# Patient Record
Sex: FEMALE | Race: WHITE | NOT HISPANIC OR LATINO | Employment: UNEMPLOYED | URBAN - METROPOLITAN AREA
[De-identification: names, ages, dates, MRNs, and addresses within clinical notes are randomized per-mention and may not be internally consistent; named-entity substitution may affect disease eponyms.]

---

## 2023-02-03 ENCOUNTER — HOSPITAL ENCOUNTER (OUTPATIENT)
Dept: DATA CONVERSION | Facility: HOSPITAL | Age: 23
End: 2023-02-03
Attending: OBSTETRICS & GYNECOLOGY

## 2023-02-03 DIAGNOSIS — O21.8 OTHER VOMITING COMPLICATING PREGNANCY (HHS-HCC): ICD-10-CM

## 2023-02-03 DIAGNOSIS — O23.42 UNSPECIFIED INFECTION OF URINARY TRACT IN PREGNANCY, SECOND TRIMESTER (HHS-HCC): ICD-10-CM

## 2023-02-03 DIAGNOSIS — F32.A DEPRESSION, UNSPECIFIED: ICD-10-CM

## 2023-02-03 DIAGNOSIS — F14.21 COCAINE DEPENDENCE, IN REMISSION (MULTI): ICD-10-CM

## 2023-02-03 DIAGNOSIS — F43.10 POST-TRAUMATIC STRESS DISORDER, UNSPECIFIED: ICD-10-CM

## 2023-02-03 DIAGNOSIS — O21.9 VOMITING OF PREGNANCY, UNSPECIFIED (HHS-HCC): ICD-10-CM

## 2023-02-03 DIAGNOSIS — Z3A.16 16 WEEKS GESTATION OF PREGNANCY (HHS-HCC): ICD-10-CM

## 2023-02-03 DIAGNOSIS — N39.0 URINARY TRACT INFECTION, SITE NOT SPECIFIED: ICD-10-CM

## 2023-02-03 DIAGNOSIS — O26.892 OTHER SPECIFIED PREGNANCY RELATED CONDITIONS, SECOND TRIMESTER (HHS-HCC): ICD-10-CM

## 2023-02-03 DIAGNOSIS — R10.30 LOWER ABDOMINAL PAIN, UNSPECIFIED: ICD-10-CM

## 2023-02-03 DIAGNOSIS — O99.342 OTHER MENTAL DISORDERS COMPLICATING PREGNANCY, SECOND TRIMESTER (HHS-HCC): ICD-10-CM

## 2023-02-03 DIAGNOSIS — Z79.899 OTHER LONG TERM (CURRENT) DRUG THERAPY: ICD-10-CM

## 2023-02-03 LAB
APPEARANCE, URINE: ABNORMAL
BASOPHILS (10*3/UL) IN BLOOD BY AUTOMATED COUNT: 0.01 X10E9/L (ref 0–0.1)
BASOPHILS/100 LEUKOCYTES IN BLOOD BY AUTOMATED COUNT: 0.2 % (ref 0–2)
BILIRUBIN, URINE: NEGATIVE
BLOOD, URINE: NEGATIVE
COLOR, URINE: ABNORMAL
EOSINOPHILS (10*3/UL) IN BLOOD BY AUTOMATED COUNT: 0.05 X10E9/L (ref 0–0.7)
EOSINOPHILS/100 LEUKOCYTES IN BLOOD BY AUTOMATED COUNT: 0.8 % (ref 0–6)
ERYTHROCYTE DISTRIBUTION WIDTH (RATIO) BY AUTOMATED COUNT: 14.1 % (ref 11.5–14.5)
ERYTHROCYTE MEAN CORPUSCULAR HEMOGLOBIN CONCENTRATION (G/DL) BY AUTOMATED: 32.6 G/DL (ref 32–36)
ERYTHROCYTE MEAN CORPUSCULAR VOLUME (FL) BY AUTOMATED COUNT: 85 FL (ref 80–100)
ERYTHROCYTES (10*6/UL) IN BLOOD BY AUTOMATED COUNT: 4.09 X10E12/L (ref 4–5.2)
GLUCOSE, URINE: ABNORMAL MG/DL
HEMATOCRIT (%) IN BLOOD BY AUTOMATED COUNT: 34.7 % (ref 36–46)
HEMOGLOBIN (G/DL) IN BLOOD: 11.3 G/DL (ref 12–16)
HYALINE CASTS, URINE: ABNORMAL /LPF
IMMATURE GRANULOCYTES/100 LEUKOCYTES IN BLOOD BY AUTOMATED COUNT: 0.2 % (ref 0–0.9)
KETONES, URINE: NEGATIVE MG/DL
LEUKOCYTE ESTERASE, URINE: NEGATIVE
LEUKOCYTES (10*3/UL) IN BLOOD BY AUTOMATED COUNT: 6.2 X10E9/L (ref 4.4–11.3)
LYMPHOCYTES (10*3/UL) IN BLOOD BY AUTOMATED COUNT: 1.02 X10E9/L (ref 1.2–4.8)
LYMPHOCYTES/100 LEUKOCYTES IN BLOOD BY AUTOMATED COUNT: 16.5 % (ref 13–44)
MONOCYTES (10*3/UL) IN BLOOD BY AUTOMATED COUNT: 0.46 X10E9/L (ref 0.1–1)
MONOCYTES/100 LEUKOCYTES IN BLOOD BY AUTOMATED COUNT: 7.4 % (ref 2–10)
MUCUS, URINE: ABNORMAL /LPF
NEUTROPHILS (10*3/UL) IN BLOOD BY AUTOMATED COUNT: 4.65 X10E9/L (ref 1.2–7.7)
NEUTROPHILS/100 LEUKOCYTES IN BLOOD BY AUTOMATED COUNT: 74.9 % (ref 40–80)
NITRITE, URINE: POSITIVE
NRBC (PER 100 WBCS) BY AUTOMATED COUNT: 0 /100 WBC (ref 0–0)
PH, URINE: 5 (ref 5–8)
PLATELETS (10*3/UL) IN BLOOD AUTOMATED COUNT: 233 X10E9/L (ref 150–450)
PROTEIN, URINE: ABNORMAL MG/DL
RBC, URINE: 1 /HPF (ref 0–5)
SPECIFIC GRAVITY, URINE: 1.01 (ref 1–1.03)
SQUAMOUS EPITHELIAL CELLS, URINE: 11 /HPF
UROBILINOGEN, URINE: 4 MG/DL (ref 0–1.9)
WBC, URINE: 34 /HPF (ref 0–5)

## 2023-02-05 LAB
CHLAMYDIA TRACH., AMPLIFIED: NEGATIVE
N. GONORRHEA, AMPLIFIED: NEGATIVE

## 2023-03-03 NOTE — PROGRESS NOTES
Current Stage:   Stage: Triage     Subjective Data:   Antepartum:  Vaginal Bleeding: No   Contractions/Abdominal Pain: Yes   Discharge/Loss of Fluid: No   Fetal Movement: None   Fevers/Chills: No   Preeclampsia Symptoms: No   Antepartum:    21 yo  at 16.4wga by 11.5 wk US at [facility], presents with lower abdominal cramping and N/V x past 24 hrs.     Pt was seen on  at [facility] for UTI symptoms and receives care at [facility]. Pt's UA yesterday showed + nitrites, was given Macrobid which she started this am after she vomited. Denies F/C. Denies VB, vaginal itching, irritation, or odor. Pt reports lower abdominal  cramping became worse this morning. She has been taking AZO and thought her urine had blood in it today.     Pregnancy notable for:  - LISSY, sober 5 months from crack/cocaine. Living in [facility]. Utox negative on   - CT and trich + on  and treated, trich MARILOU neg, no CT MARILOU, sent on 2/3  - UTI at 7wga; pan-sens E coli, - 3 UTIs this pregnancy; suppression?  - depression/PTSD; hospitalized for SI/HI this preg. Currently taking Zoloft 50 mg daily.     OBHx: reports 4 early SABs  GYN hx:  recent h/o of trich and CT  PMH: denies  PSH: wisdom teeth, gallbladder  Fam hx: non-contributory  Meds: PNV, Zoloft, Macrobid  All: PCN, Sulfa  Social hx: denies t/e/d. Currently lives in [facility]. Feels safe there.           Objective Information:    Objective Information:      T   P  R  BP   MAP  SpO2   Value  36  81  16  115/67   86  98%  Date/Time 2/3 11:35 2/3 12:22 3 14:20 3 11:43  23 11:43 23 14:20  Range  (36C - 36.4C )  (81 - 120 )  (16 - 16 )  (115 - 123 )/ (67 - 84 )  (86 - 86 )  (87% - 100% )      Pain reported at 2/3 14:20: 0 = None      Physical Exam:   Constitutional: alert, oriented x3, conversational   Obstetric: Doptones 141  SVE: closed/long/high   Eyes: Sclera white, EOM intact, no discharge or erythema   ENMT: No hearing deficit, no goiter present   Head/Neck:  Normocephalic, atraumatic, full range  of motion intact   Respiratory/Thorax: normal respiratory effort   Gastrointestinal: soft, gravid, non-tender   Genitourinary: No flank pain bilaterally  Urine dip + nitrites, UA negative blood   Musculoskeletal: Grossly WNL   Extremities: No edema, discoloration, or pain in  BLE   Neurological: Speech clear, no deficits noted   Psychological: Appropriate mood, behavior. Calm,  cooperative.   Skin: no rashes or lesions     Recent Lab Results:    Results:    CBC: 2/3/2023 13:17              \     Hgb     /                              \     11.3 L    /  WBC  ----------------  Plt               6.2       ----------------    233              /     Hct     \                              /     34.7 L    \            RBC: 4.09     MCV: 85     Neutrophil %: 74.9      CMP: 2/3/2023 12:10  NA+        Cl-     BUN  /                         Canceled    Canceled    Canceled  /  --------------------------------  Glucose                ---------------------------  Canceled    K+     HCO3-   Creat \                         Canceled    Canceled    Canceled  \           \  T Bili  /                    \  Canceled  /  AST  x ---- x ALT        Canceled x ---- x Canceled Patients treated with Sulfasalazine may generate    falsely decreased results for ALT.         /  Alk P   \               /  Canceled  \  Calcium : Canceled     Anion Gap : Canceled     Albumin : Canceled     T Protein : Canceled           Assessment and Plan:   Assessment:    23 yo  at 16.4wga by 11.5 wk US at [facility], presents with lower abdominal cramping and N/V x past 24 hrs.     Abdominal Pain/Nausea/Vomiting  - seen at [facility] on  and prescribed Macrobid for UTI (+nitrites on UA)  - high suspicion for UTI, pt started Macrobid this am after vomiting  - pt here for N/V,  blood in urine, last took AZO at 0630  - afebrile, no flank pain  - UA negative for blood, culture sent  - GC/CT sent  - CBC wnl  - LR bolus, IV  Zofran, Tylenol given with resolution of symptoms  - SVE closed/long/high, no suspicion for PTL at this time  - discussed warning signs/when to return for eval, pt verb understanding  - Tylenol and Zofran scripts sent  - encouraged BRAT diet and increased hydration    Maternal Well-being  - Vital signs stable and WNL  - Emotional support and reassurance provided  - All questions and concerns addressed  - encouraged to keep appt as scheduled on 2/24 at [facility] or sooner if needed  - SAFET low risk    Fetal Well Being  - Doptones 141    Dispo: Home with precautions  Plan discussed with Dr. Prescott and agrees with d/c home.  Jackie Powers, MSN, APRN, WHNP-BC                Plan of Care Reviewed With:  Plan of Care Reviewed With: patient     Attestation:   Note Completion:  I am a:  Advanced Practice Provider   Attending Only - Shared Visit with Advanced Practice Provider This is a shared visit.  I have reviewed the Advanced Practice Provider?s encounter note, approve the Advanced Practice Provider?s documentation,  and provide the following additional information from my personal encounter.    Comments/ Additional Findings    16 weeks, known UTI with only one dose of macrobid taken.  Presents with N/V.  No CVA tenderness and normal white count.  Pt tolerated by mouth challenge  after getting IV fluids and Zofran.  Ok for discharge.  Return precautions discussed  Conor Prescott MD          Electronic Signatures:  Conor Prescott)  (Signed 03-Feb-2023 15:50)   Authored: Note Completion   Co-Signer: Current Stage, Subjective Data, Objective Data, Assessment and Plan, Note Completion  Jackie Powers (APRN-CNP)  (Signed 03-Feb-2023 14:50)   Authored: Current Stage, Subjective Data, Objective Data,  Assessment and Plan, Note Completion      Last Updated: 03-Feb-2023 15:50 by Conor Prescott)

## 2023-03-05 ENCOUNTER — HOSPITAL ENCOUNTER (OUTPATIENT)
Dept: DATA CONVERSION | Facility: HOSPITAL | Age: 23
End: 2023-03-05
Attending: OBSTETRICS & GYNECOLOGY

## 2023-03-05 DIAGNOSIS — O99.342 OTHER MENTAL DISORDERS COMPLICATING PREGNANCY, SECOND TRIMESTER (HHS-HCC): ICD-10-CM

## 2023-03-05 DIAGNOSIS — Z3A.20 20 WEEKS GESTATION OF PREGNANCY (HHS-HCC): ICD-10-CM

## 2023-03-05 DIAGNOSIS — Z87.440 PERSONAL HISTORY OF URINARY (TRACT) INFECTIONS: ICD-10-CM

## 2023-03-05 DIAGNOSIS — M54.9 DORSALGIA, UNSPECIFIED: ICD-10-CM

## 2023-03-05 DIAGNOSIS — O26.892 OTHER SPECIFIED PREGNANCY RELATED CONDITIONS, SECOND TRIMESTER (HHS-HCC): ICD-10-CM

## 2023-03-05 DIAGNOSIS — O99.891 OTHER SPECIFIED DISEASES AND CONDITIONS COMPLICATING PREGNANCY (HHS-HCC): ICD-10-CM

## 2023-03-05 DIAGNOSIS — R10.2 PELVIC AND PERINEAL PAIN: ICD-10-CM

## 2023-03-05 DIAGNOSIS — O99.322 DRUG USE COMPLICATING PREGNANCY, SECOND TRIMESTER (HHS-HCC): ICD-10-CM

## 2023-03-05 DIAGNOSIS — F32.A DEPRESSION, UNSPECIFIED: ICD-10-CM

## 2023-03-05 DIAGNOSIS — R11.0 NAUSEA: ICD-10-CM

## 2023-03-05 DIAGNOSIS — Z79.899 OTHER LONG TERM (CURRENT) DRUG THERAPY: ICD-10-CM

## 2023-03-05 DIAGNOSIS — F14.91 COCAINE USE, UNSPECIFIED, IN REMISSION: ICD-10-CM

## 2023-03-05 DIAGNOSIS — F43.10 POST-TRAUMATIC STRESS DISORDER, UNSPECIFIED: ICD-10-CM

## 2023-03-09 LAB — URINE CULTURE: ABNORMAL

## 2023-04-02 ENCOUNTER — HOSPITAL ENCOUNTER (OUTPATIENT)
Dept: DATA CONVERSION | Facility: HOSPITAL | Age: 23
End: 2023-04-02
Attending: OBSTETRICS & GYNECOLOGY | Admitting: OBSTETRICS & GYNECOLOGY

## 2023-04-02 DIAGNOSIS — Z87.440 PERSONAL HISTORY OF URINARY (TRACT) INFECTIONS: ICD-10-CM

## 2023-04-02 DIAGNOSIS — O26.892 OTHER SPECIFIED PREGNANCY RELATED CONDITIONS, SECOND TRIMESTER (HHS-HCC): ICD-10-CM

## 2023-04-02 DIAGNOSIS — R10.2 PELVIC AND PERINEAL PAIN: ICD-10-CM

## 2023-04-02 DIAGNOSIS — O23.12: ICD-10-CM

## 2023-04-02 DIAGNOSIS — Z3A.24 24 WEEKS GESTATION OF PREGNANCY (HHS-HCC): ICD-10-CM

## 2023-04-02 DIAGNOSIS — O09.292 SUPERVISION OF PREGNANCY WITH OTHER POOR REPRODUCTIVE OR OBSTETRIC HISTORY, SECOND TRIMESTER (HHS-HCC): ICD-10-CM

## 2023-04-02 DIAGNOSIS — O99.322 DRUG USE COMPLICATING PREGNANCY, SECOND TRIMESTER (HHS-HCC): ICD-10-CM

## 2023-04-02 DIAGNOSIS — O99.342 OTHER MENTAL DISORDERS COMPLICATING PREGNANCY, SECOND TRIMESTER (HHS-HCC): ICD-10-CM

## 2023-04-02 DIAGNOSIS — F43.10 POST-TRAUMATIC STRESS DISORDER, UNSPECIFIED: ICD-10-CM

## 2023-04-02 DIAGNOSIS — Z79.899 OTHER LONG TERM (CURRENT) DRUG THERAPY: ICD-10-CM

## 2023-04-02 DIAGNOSIS — F14.91 COCAINE USE, UNSPECIFIED, IN REMISSION: ICD-10-CM

## 2023-04-02 DIAGNOSIS — Z59.819 HOUSING INSTABILITY, HOUSED UNSPECIFIED: ICD-10-CM

## 2023-04-02 DIAGNOSIS — N89.8 OTHER SPECIFIED NONINFLAMMATORY DISORDERS OF VAGINA: ICD-10-CM

## 2023-04-02 DIAGNOSIS — F32.A DEPRESSION, UNSPECIFIED: ICD-10-CM

## 2023-04-02 DIAGNOSIS — Z3A.26 26 WEEKS GESTATION OF PREGNANCY (HHS-HCC): ICD-10-CM

## 2023-04-02 SDOH — ECONOMIC STABILITY - HOUSING INSECURITY: HOUSING INSTABILITY UNSPECIFIED: Z59.819

## 2023-04-03 LAB
CHLAMYDIA TRACH., AMPLIFIED: NEGATIVE
CLUE CELLS WET PREP: NORMAL
N. GONORRHEA, AMPLIFIED: NEGATIVE
TRICH WET PREP: NORMAL
TRICHOMONAS REFLEX COMMENT: NORMAL
TRICHOMONAS VAGINALIS: NEGATIVE
WBC WET PREP: NORMAL /HPF
YEAST PRESENCE WET PREP: NORMAL

## 2023-04-04 LAB — URINE CULTURE: NO GROWTH

## 2023-04-13 ENCOUNTER — HOSPITAL ENCOUNTER (OUTPATIENT)
Age: 23
Setting detail: OBSERVATION
Discharge: OTHER INSTITUTION | End: 2023-04-14
Attending: OBSTETRICS & GYNECOLOGY | Admitting: OBSTETRICS & GYNECOLOGY
Payer: COMMERCIAL

## 2023-04-13 PROBLEM — Z3A.26 26 WEEKS GESTATION OF PREGNANCY: Status: ACTIVE | Noted: 2023-04-13

## 2023-04-13 LAB
AMPHET UR QL SCN: NOT DETECTED
BARBITURATES UR QL SCN: NOT DETECTED
BENZODIAZ UR QL SCN: NOT DETECTED
BILIRUB UR QL STRIP: NEGATIVE
CANNABINOIDS UR QL SCN: NOT DETECTED
CLARITY UR: CLEAR
COCAINE UR QL SCN: NOT DETECTED
COLOR UR: YELLOW
DRUG SCREEN COMMENT UR-IMP: NORMAL
FENTANYL SCREEN, URINE: NOT DETECTED
GLUCOSE UR STRIP-MCNC: NEGATIVE MG/DL
HGB UR QL STRIP: NEGATIVE
KETONES UR STRIP-MCNC: NEGATIVE MG/DL
LEUKOCYTE ESTERASE UR QL STRIP: NEGATIVE
METHADONE UR QL SCN: NOT DETECTED
NITRITE UR QL STRIP: NEGATIVE
OPIATES UR QL SCN: NOT DETECTED
OXYCODONE URINE: NOT DETECTED
PCP UR QL SCN: NOT DETECTED
PH UR STRIP: 6.5 [PH] (ref 5–9)
PROT UR STRIP-MCNC: NEGATIVE MG/DL
SP GR UR STRIP: 1.02 (ref 1–1.03)
SPECIMEN SOURCE: NORMAL
UROBILINOGEN UR STRIP-ACNC: 0.2 E.U./DL

## 2023-04-13 PROCEDURE — 80307 DRUG TEST PRSMV CHEM ANLYZR: CPT

## 2023-04-13 PROCEDURE — 81003 URINALYSIS AUTO W/O SCOPE: CPT

## 2023-04-13 PROCEDURE — 99221 1ST HOSP IP/OBS SF/LOW 40: CPT | Performed by: MIDWIFE

## 2023-04-13 PROCEDURE — G0378 HOSPITAL OBSERVATION PER HR: HCPCS

## 2023-04-13 PROCEDURE — 87491 CHLMYD TRACH DNA AMP PROBE: CPT

## 2023-04-13 PROCEDURE — 87591 N.GONORRHOEAE DNA AMP PROB: CPT

## 2023-04-14 ENCOUNTER — ANCILLARY PROCEDURE (OUTPATIENT)
Dept: OBGYN CLINIC | Age: 23
End: 2023-04-14
Payer: COMMERCIAL

## 2023-04-14 VITALS
HEART RATE: 68 BPM | RESPIRATION RATE: 16 BRPM | TEMPERATURE: 97.8 F | SYSTOLIC BLOOD PRESSURE: 110 MMHG | DIASTOLIC BLOOD PRESSURE: 53 MMHG | OXYGEN SATURATION: 100 %

## 2023-04-14 PROCEDURE — 6370000000 HC RX 637 (ALT 250 FOR IP): Performed by: MIDWIFE

## 2023-04-14 PROCEDURE — G0378 HOSPITAL OBSERVATION PER HR: HCPCS

## 2023-04-14 RX ORDER — ACETAMINOPHEN 325 MG/1
650 TABLET ORAL ONCE
Status: COMPLETED | OUTPATIENT
Start: 2023-04-14 | End: 2023-04-14

## 2023-04-14 RX ADMIN — ACETAMINOPHEN 650 MG: 325 TABLET ORAL at 01:02

## 2023-04-14 NOTE — PROGRESS NOTES
Dr. Charlette Saldana at pt bedside. States pt is to be moved to a private room and stay to be provided resources. General diet to be placed.

## 2023-04-14 NOTE — H&P
female at 26w3d   Abdominal pain    There is no problem list on file for this patient.     Fetus: Reassuring  GBS: not done    PLAN:  Discussed with Dr Serenity Hassan  Prenatal labs, if not available  UA  Urine Drug screen  Chlamydia/Ryley/Trich   Consult to [facility]  Social work consult    FARIDA Meredith CNM  2023 8:59 PM

## 2023-04-14 NOTE — PROGRESS NOTES
presents by ambulance from [address] at 26w3d with complaints of leaking of fluid since . States she felt a gush of fluid and feels like she has been continuously leaking since then. States since she felt that gush she has felt a continuous cramping in her belly she rates an 8 out of 10. Pt denies any vaginal bleeding. Pt states +FM. Placed on EFM, call light within reach.

## 2023-04-14 NOTE — PROGRESS NOTES
Spoke with Dr Carolin Cintron, notified patient has a place at somewhere safe to go. States per his standpoint patient is okay to be discharged.

## 2023-04-14 NOTE — PROGRESS NOTES
[phone] this RN on phone with [facility] to get a ride for patient. Representative confirmed patient will be picked up at this address from the times of [phone]. Patient will receive a text or phone call from  15 minutes out before arrival. Confirmation # [id number]. Verified phone number with patient. Reviewed plan with patient. Patient understood and states will call out to notify RN when  arrives.

## 2023-04-14 NOTE — PROGRESS NOTES
Assumed care of pt. Resting in bed, complaints of lower abdominal cramping that she would rate 6/10. Placed on efm and toco. Denies any LOF, VB or ctx. Perceives good fetal movement. VSS. Call light within reach.

## 2023-04-14 NOTE — PROGRESS NOTES
[facility] spoke with [place]. States they are not able to provide a ride. Will call [facility] transportation that  provided.

## 2023-04-14 NOTE — PROGRESS NOTES
Notified Dr Federico Avalos of patient. Updated MFM discharged patient from his standpoint and patient has a place to go. Okay to be discharged.

## 2023-04-14 NOTE — PROGRESS NOTES
Spoke with aislinn Butt for patient to be TID monitoring. States waiting on patient to have safe place to go before discharged.
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
## 2023-04-14 NOTE — PROGRESS NOTES
Spoke with [facility] notified patient will be arriving via [address] per [employer] to facility shortly. Notified patient will be picked up here between [phone].

## 2023-04-14 NOTE — PROGRESS NOTES
Discharge instructions provided and educated to patient. Patient instructed to follow up with [facility], information provided. Patient understood instructions. Provided signature. RN wheeled patient down.  provided by [employer] arrived and picked up patient and took to [facility]. [facility] called and notified patient is in route.

## 2023-04-18 LAB
CHLAMYDIA DNA UR QL NAA+PROBE: NEGATIVE
N GONORRHOEA DNA UR QL NAA+PROBE: NEGATIVE
SPECIMEN SOURCE: NORMAL

## 2023-04-24 ENCOUNTER — HOSPITAL ENCOUNTER (INPATIENT)
Age: 23
LOS: 1 days | Discharge: HOME | DRG: 566 | End: 2023-04-25
Attending: OBSTETRICS & GYNECOLOGY | Admitting: OBSTETRICS & GYNECOLOGY
Payer: COMMERCIAL

## 2023-04-24 VITALS
DIASTOLIC BLOOD PRESSURE: 65 MMHG | SYSTOLIC BLOOD PRESSURE: 114 MMHG | HEART RATE: 93 BPM | OXYGEN SATURATION: 100 % | RESPIRATION RATE: 16 BRPM | TEMPERATURE: 98 F

## 2023-04-24 PROBLEM — O26.90 PREGNANCY WITH COMPLICATION: Status: ACTIVE | Noted: 2023-04-24

## 2023-04-24 LAB
ALBUMIN SERPL-MCNC: 3.4 G/DL (ref 3.5–5.2)
ALP SERPL-CCNC: 72 U/L (ref 35–104)
ALT SERPL-CCNC: 11 U/L (ref 0–32)
AMPHET UR QL SCN: NOT DETECTED
ANION GAP SERPL CALCULATED.3IONS-SCNC: 10 MMOL/L (ref 7–16)
AST SERPL-CCNC: 19 U/L (ref 0–31)
BACTERIA URNS QL MICRO: ABNORMAL /HPF
BARBITURATES UR QL SCN: NOT DETECTED
BENZODIAZ UR QL SCN: NOT DETECTED
BILIRUB SERPL-MCNC: 0.2 MG/DL (ref 0–1.2)
BILIRUB UR QL STRIP: NEGATIVE
BUN SERPL-MCNC: 10 MG/DL (ref 6–20)
CALCIUM SERPL-MCNC: 9 MG/DL (ref 8.6–10.2)
CANNABINOIDS UR QL SCN: NOT DETECTED
CHLORIDE SERPL-SCNC: 105 MMOL/L (ref 98–107)
CLARITY UR: CLEAR
CLUE CELLS VAG QL WET PREP: NORMAL
CO2 SERPL-SCNC: 23 MMOL/L (ref 22–29)
COCAINE UR QL SCN: NOT DETECTED
COLOR UR: YELLOW
CREAT SERPL-MCNC: 0.4 MG/DL (ref 0.5–1)
DRUG SCREEN COMMENT UR-IMP: NORMAL
EPI CELLS #/AREA URNS HPF: ABNORMAL /HPF
ERYTHROCYTE [DISTWIDTH] IN BLOOD BY AUTOMATED COUNT: 12.9 FL (ref 11.5–15)
FENTANYL SCREEN, URINE: NOT DETECTED
GLUCOSE SERPL-MCNC: 94 MG/DL (ref 74–99)
GLUCOSE UR STRIP-MCNC: NEGATIVE MG/DL
HCT VFR BLD AUTO: 34 % (ref 34–48)
HGB BLD-MCNC: 11.1 G/DL (ref 11.5–15.5)
HGB UR QL STRIP: NEGATIVE
KETONES UR STRIP-MCNC: ABNORMAL MG/DL
LEUKOCYTE ESTERASE UR QL STRIP: NEGATIVE
MCH RBC QN AUTO: 28.4 PG (ref 26–35)
MCHC RBC AUTO-ENTMCNC: 32.6 % (ref 32–34.5)
MCV RBC AUTO: 87 FL (ref 80–99.9)
METHADONE UR QL SCN: NOT DETECTED
NITRITE UR QL STRIP: NEGATIVE
OPIATES UR QL SCN: NOT DETECTED
OXYCODONE URINE: NOT DETECTED
PCP UR QL SCN: NOT DETECTED
PH UR STRIP: 5.5 [PH] (ref 5–9)
PLATELET # BLD AUTO: 267 E9/L (ref 130–450)
PMV BLD AUTO: 10.3 FL (ref 7–12)
POTASSIUM SERPL-SCNC: 3.7 MMOL/L (ref 3.5–5)
PROT SERPL-MCNC: 6.8 G/DL (ref 6.4–8.3)
PROT UR STRIP-MCNC: NEGATIVE MG/DL
RBC # BLD AUTO: 3.91 E12/L (ref 3.5–5.5)
RBC #/AREA URNS HPF: ABNORMAL /HPF (ref 0–2)
SODIUM SERPL-SCNC: 138 MMOL/L (ref 132–146)
SP GR UR STRIP: 1.02 (ref 1–1.03)
SPECIMEN SOURCE FLD: NORMAL
T VAGINALIS VAG QL WET PREP: NORMAL
UROBILINOGEN UR STRIP-ACNC: 0.2 E.U./DL
WBC # BLD: 10.4 E9/L (ref 4.5–11.5)
WBC #/AREA URNS HPF: ABNORMAL /HPF (ref 0–5)
YEAST VAG QL WET PREP: NORMAL

## 2023-04-24 PROCEDURE — 82731 ASSAY OF FETAL FIBRONECTIN: CPT

## 2023-04-24 PROCEDURE — 81001 URINALYSIS AUTO W/SCOPE: CPT

## 2023-04-24 PROCEDURE — 87210 SMEAR WET MOUNT SALINE/INK: CPT

## 2023-04-24 PROCEDURE — 87591 N.GONORRHOEAE DNA AMP PROB: CPT

## 2023-04-24 PROCEDURE — 1220000001 HC SEMI PRIVATE L&D R&B

## 2023-04-24 PROCEDURE — 2580000003 HC RX 258: Performed by: OBSTETRICS & GYNECOLOGY

## 2023-04-24 PROCEDURE — 80053 COMPREHEN METABOLIC PANEL: CPT

## 2023-04-24 PROCEDURE — 36415 COLL VENOUS BLD VENIPUNCTURE: CPT

## 2023-04-24 PROCEDURE — 87491 CHLMYD TRACH DNA AMP PROBE: CPT

## 2023-04-24 PROCEDURE — 85027 COMPLETE CBC AUTOMATED: CPT

## 2023-04-24 PROCEDURE — 80307 DRUG TEST PRSMV CHEM ANLYZR: CPT

## 2023-04-24 RX ORDER — SODIUM CHLORIDE, SODIUM LACTATE, POTASSIUM CHLORIDE, CALCIUM CHLORIDE 600; 310; 30; 20 MG/100ML; MG/100ML; MG/100ML; MG/100ML
INJECTION, SOLUTION INTRAVENOUS CONTINUOUS
Status: DISCONTINUED | OUTPATIENT
Start: 2023-04-24 | End: 2023-04-25 | Stop reason: HOSPADM

## 2023-04-24 RX ORDER — SODIUM CHLORIDE, SODIUM LACTATE, POTASSIUM CHLORIDE, AND CALCIUM CHLORIDE .6; .31; .03; .02 G/100ML; G/100ML; G/100ML; G/100ML
500 INJECTION, SOLUTION INTRAVENOUS ONCE
Status: COMPLETED | OUTPATIENT
Start: 2023-04-24 | End: 2023-04-24

## 2023-04-24 RX ORDER — ONDANSETRON 2 MG/ML
4 INJECTION INTRAMUSCULAR; INTRAVENOUS EVERY 6 HOURS PRN
Status: DISCONTINUED | OUTPATIENT
Start: 2023-04-24 | End: 2023-04-25 | Stop reason: HOSPADM

## 2023-04-24 RX ORDER — BUTORPHANOL TARTRATE 1 MG/ML
1 INJECTION, SOLUTION INTRAMUSCULAR; INTRAVENOUS
Status: DISCONTINUED | OUTPATIENT
Start: 2023-04-24 | End: 2023-04-25 | Stop reason: HOSPADM

## 2023-04-24 RX ADMIN — SODIUM CHLORIDE, POTASSIUM CHLORIDE, SODIUM LACTATE AND CALCIUM CHLORIDE 500 ML: 600; 310; 30; 20 INJECTION, SOLUTION INTRAVENOUS at 22:20

## 2023-04-24 RX ADMIN — SODIUM CHLORIDE, POTASSIUM CHLORIDE, SODIUM LACTATE AND CALCIUM CHLORIDE: 600; 310; 30; 20 INJECTION, SOLUTION INTRAVENOUS at 22:51

## 2023-04-25 LAB — FETAL FIBRONECTIN: NEGATIVE

## 2023-04-25 PROCEDURE — 6360000002 HC RX W HCPCS: Performed by: OBSTETRICS & GYNECOLOGY

## 2023-04-25 PROCEDURE — 96361 HYDRATE IV INFUSION ADD-ON: CPT

## 2023-04-25 PROCEDURE — 96360 HYDRATION IV INFUSION INIT: CPT

## 2023-04-25 RX ADMIN — BUTORPHANOL TARTRATE 1 MG: 1 INJECTION, SOLUTION INTRAMUSCULAR; INTRAVENOUS at 01:19

## 2023-04-25 ASSESSMENT — PAIN SCALES - GENERAL: PAINLEVEL_OUTOF10: 6

## 2023-04-25 NOTE — PROGRESS NOTES
28 week patient arrived to unit via squad with c/o's, abdomina pain, back pain, headache, and nausea for two weeks. Has not been able to eat for the past two days. Been forcing herself to try to eat. Patient denies any bleeding or lof and perceives fetal movement. Placed on EFM and TOCO with audible fetal movement. Oriented to room with call light in reach.
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
## 2023-04-25 NOTE — PROGRESS NOTES
in room to see patient. Patient is sound asleep. Feed patient when she wakes up then discharge her.

## 2023-04-25 NOTE — PROGRESS NOTES
Assumed care of patient. Patient resting comfortably. Monitors on. D/C order written. Plan of care discussed with patient.

## 2023-04-25 NOTE — DISCHARGE INSTRUCTIONS
Weeks 26 to 30 of Your Pregnancy: Care Instructions  You're starting your last trimester. Renetta Marroquin probably feel your baby moving around more. Your back may ache as your body gets used to your baby's size and length. Take care of yourself, and pay attention to what your body needs. Talk to your doctor about getting the Tdap shot. It will help protect your  against whooping cough (pertussis). You may have Seven-Miller contractions. They are single or several strong contractions without a pattern. These are practice contractions but not the start of labor. Be kind to yourself. Take breaks when you're tired. Change positions often. Don't sit for too long or stand for too long. At work, rest during breaks if you can. If you don't get breaks, talk to your doctor about writing a letter to your employer to request them. Avoid fumes, chemicals, and tobacco smoke. Be sexual if you want to. You may be interested in sex, or you may not. Everyone is different. Sex is okay unless your doctor tells you not to. Your belly can make it hard to find good positions for sex. [place] and explore. Watch for signs of  labor. These signs include:   Menstrual-like cramps. Or you may have pain or pressure in your pelvis that happens in a pattern. About 6 or more contractions in an hour (even after rest and a glass of water). A low, dull backache that doesn't go away when you change positions. An increase or change in vaginal discharge. Your water breaking. Know what to do if you think you are having contractions. Drink 1 or 2 glasses of water. Lie down on your left side for at least an hour. While on your side, feel the top of your belly to see if it's tight. Write down your contractions for an hour. Time how long it is from the start of one contraction to the start of the next. Call your doctor if you have regular contractions.   Follow-up care is a key part of your treatment and

## 2023-05-07 ENCOUNTER — HOSPITAL ENCOUNTER (OUTPATIENT)
Age: 23
Setting detail: OBSERVATION
Discharge: HOME | End: 2023-05-08
Attending: OBSTETRICS & GYNECOLOGY | Admitting: OBSTETRICS & GYNECOLOGY
Payer: COMMERCIAL

## 2023-05-07 PROBLEM — O26.93 COMPLICATED PREGNANCY, THIRD TRIMESTER: Status: ACTIVE | Noted: 2023-05-07

## 2023-05-07 LAB
ALBUMIN SERPL-MCNC: 3.5 G/DL (ref 3.5–5.2)
ALP SERPL-CCNC: 88 U/L (ref 35–104)
ALT SERPL-CCNC: 8 U/L (ref 0–32)
AMNISURE, POC: NEGATIVE
AMPHET UR QL SCN: NOT DETECTED
AMYLASE SERPL-CCNC: 39 U/L (ref 20–100)
ANION GAP SERPL CALCULATED.3IONS-SCNC: 11 MMOL/L (ref 7–16)
AST SERPL-CCNC: 21 U/L (ref 0–31)
BARBITURATES UR QL SCN: NOT DETECTED
BASOPHILS # BLD: 0.02 E9/L (ref 0–0.2)
BASOPHILS NFR BLD: 0.2 % (ref 0–2)
BENZODIAZ UR QL SCN: NOT DETECTED
BILIRUB SERPL-MCNC: 0.3 MG/DL (ref 0–1.2)
BILIRUB UR QL STRIP: NEGATIVE
BUN SERPL-MCNC: 6 MG/DL (ref 6–20)
CALCIUM SERPL-MCNC: 8.8 MG/DL (ref 8.6–10.2)
CANNABINOIDS UR QL SCN: NOT DETECTED
CHLORIDE SERPL-SCNC: 105 MMOL/L (ref 98–107)
CLARITY UR: CLEAR
CO2 SERPL-SCNC: 22 MMOL/L (ref 22–29)
COCAINE UR QL SCN: NOT DETECTED
COLOR UR: YELLOW
CREAT SERPL-MCNC: 0.4 MG/DL (ref 0.5–1)
DRUG SCREEN COMMENT UR-IMP: NORMAL
EOSINOPHIL # BLD: 0.08 E9/L (ref 0.05–0.5)
EOSINOPHIL NFR BLD: 0.9 % (ref 0–6)
ERYTHROCYTE [DISTWIDTH] IN BLOOD BY AUTOMATED COUNT: 12.7 FL (ref 11.5–15)
FENTANYL SCREEN, URINE: NOT DETECTED
GLUCOSE SERPL-MCNC: 68 MG/DL (ref 74–99)
GLUCOSE UR STRIP-MCNC: NEGATIVE MG/DL
HCT VFR BLD AUTO: 33.9 % (ref 34–48)
HGB BLD-MCNC: 11.3 G/DL (ref 11.5–15.5)
HGB UR QL STRIP: NEGATIVE
IMM GRANULOCYTES # BLD: 0.05 E9/L
IMM GRANULOCYTES NFR BLD: 0.5 % (ref 0–5)
KETONES UR STRIP-MCNC: NEGATIVE MG/DL
LEUKOCYTE ESTERASE UR QL STRIP: NEGATIVE
LIPASE: 36 U/L (ref 13–60)
LYMPHOCYTES # BLD: 1.81 E9/L (ref 1.5–4)
LYMPHOCYTES NFR BLD: 19.7 % (ref 20–42)
Lab: NORMAL
MCH RBC QN AUTO: 28.3 PG (ref 26–35)
MCHC RBC AUTO-ENTMCNC: 33.3 % (ref 32–34.5)
MCV RBC AUTO: 84.8 FL (ref 80–99.9)
METHADONE UR QL SCN: NOT DETECTED
MONOCYTES # BLD: 0.68 E9/L (ref 0.1–0.95)
MONOCYTES NFR BLD: 7.4 % (ref 2–12)
NEGATIVE QC PASS/FAIL: NORMAL
NEUTROPHILS # BLD: 6.57 E9/L (ref 1.8–7.3)
NEUTS SEG NFR BLD: 71.3 % (ref 43–80)
NITRITE UR QL STRIP: NEGATIVE
OPIATES UR QL SCN: NOT DETECTED
OXYCODONE URINE: NOT DETECTED
PCP UR QL SCN: NOT DETECTED
PH UR STRIP: 5.5 [PH] (ref 5–9)
PLATELET # BLD AUTO: 252 E9/L (ref 130–450)
PMV BLD AUTO: 10.4 FL (ref 7–12)
POSITIVE QC PASS/FAIL: NORMAL
POTASSIUM SERPL-SCNC: 4 MMOL/L (ref 3.5–5)
PROT SERPL-MCNC: 6.9 G/DL (ref 6.4–8.3)
PROT UR STRIP-MCNC: NEGATIVE MG/DL
RBC # BLD AUTO: 4 E12/L (ref 3.5–5.5)
SODIUM SERPL-SCNC: 138 MMOL/L (ref 132–146)
SP GR UR STRIP: 1.01 (ref 1–1.03)
UROBILINOGEN UR STRIP-ACNC: 0.2 E.U./DL
WBC # BLD: 9.2 E9/L (ref 4.5–11.5)

## 2023-05-07 PROCEDURE — 81003 URINALYSIS AUTO W/O SCOPE: CPT

## 2023-05-07 PROCEDURE — 80053 COMPREHEN METABOLIC PANEL: CPT

## 2023-05-07 PROCEDURE — 83690 ASSAY OF LIPASE: CPT

## 2023-05-07 PROCEDURE — 87147 CULTURE TYPE IMMUNOLOGIC: CPT

## 2023-05-07 PROCEDURE — 82150 ASSAY OF AMYLASE: CPT

## 2023-05-07 PROCEDURE — 2580000003 HC RX 258: Performed by: OBSTETRICS & GYNECOLOGY

## 2023-05-07 PROCEDURE — 36415 COLL VENOUS BLD VENIPUNCTURE: CPT

## 2023-05-07 PROCEDURE — 85025 COMPLETE CBC W/AUTO DIFF WBC: CPT

## 2023-05-07 PROCEDURE — 87081 CULTURE SCREEN ONLY: CPT

## 2023-05-07 PROCEDURE — 99211 OFF/OP EST MAY X REQ PHY/QHP: CPT

## 2023-05-07 PROCEDURE — 96361 HYDRATE IV INFUSION ADD-ON: CPT

## 2023-05-07 PROCEDURE — 96360 HYDRATION IV INFUSION INIT: CPT

## 2023-05-07 PROCEDURE — 80307 DRUG TEST PRSMV CHEM ANLYZR: CPT

## 2023-05-07 PROCEDURE — G0378 HOSPITAL OBSERVATION PER HR: HCPCS

## 2023-05-07 PROCEDURE — 84112 EVAL AMNIOTIC FLUID PROTEIN: CPT

## 2023-05-07 RX ORDER — SODIUM CHLORIDE, SODIUM LACTATE, POTASSIUM CHLORIDE, CALCIUM CHLORIDE 600; 310; 30; 20 MG/100ML; MG/100ML; MG/100ML; MG/100ML
INJECTION, SOLUTION INTRAVENOUS CONTINUOUS
Status: DISCONTINUED | OUTPATIENT
Start: 2023-05-07 | End: 2023-05-08 | Stop reason: HOSPADM

## 2023-05-07 RX ADMIN — SODIUM CHLORIDE, POTASSIUM CHLORIDE, SODIUM LACTATE AND CALCIUM CHLORIDE 100 ML/HR: 600; 310; 30; 20 INJECTION, SOLUTION INTRAVENOUS at 22:00

## 2023-05-08 ENCOUNTER — APPOINTMENT (OUTPATIENT)
Dept: ULTRASOUND IMAGING | Age: 23
End: 2023-05-08
Payer: COMMERCIAL

## 2023-05-08 VITALS
RESPIRATION RATE: 16 BRPM | TEMPERATURE: 97.8 F | HEART RATE: 85 BPM | DIASTOLIC BLOOD PRESSURE: 73 MMHG | SYSTOLIC BLOOD PRESSURE: 121 MMHG | OXYGEN SATURATION: 98 %

## 2023-05-08 PROCEDURE — 6370000000 HC RX 637 (ALT 250 FOR IP): Performed by: OBSTETRICS & GYNECOLOGY

## 2023-05-08 PROCEDURE — 76815 OB US LIMITED FETUS(S): CPT

## 2023-05-08 PROCEDURE — 76775 US EXAM ABDO BACK WALL LIM: CPT

## 2023-05-08 PROCEDURE — G0378 HOSPITAL OBSERVATION PER HR: HCPCS

## 2023-05-08 RX ORDER — ACETAMINOPHEN 325 MG/1
650 TABLET ORAL EVERY 4 HOURS PRN
Status: DISCONTINUED | OUTPATIENT
Start: 2023-05-08 | End: 2023-05-08 | Stop reason: HOSPADM

## 2023-05-08 RX ADMIN — ACETAMINOPHEN 650 MG: 325 TABLET ORAL at 00:48

## 2023-05-08 ASSESSMENT — PAIN DESCRIPTION - ORIENTATION: ORIENTATION: LOWER

## 2023-05-08 ASSESSMENT — PAIN DESCRIPTION - DESCRIPTORS: DESCRIPTORS: CRAMPING;ACHING

## 2023-05-08 ASSESSMENT — PAIN - FUNCTIONAL ASSESSMENT: PAIN_FUNCTIONAL_ASSESSMENT: ACTIVITIES ARE NOT PREVENTED

## 2023-05-08 ASSESSMENT — PAIN DESCRIPTION - LOCATION: LOCATION: ABDOMEN

## 2023-05-08 ASSESSMENT — PAIN SCALES - GENERAL: PAINLEVEL_OUTOF10: 8

## 2023-05-08 NOTE — PROGRESS NOTES
Discharge instructions reviewed with patient. Verbalized understanding. Called [facility] with transportation voucher for patient .

## 2023-05-08 NOTE — PROGRESS NOTES
Patient Fht off monitor from shift start to 0800- Patient request to get rest. Aware of risks associated. Off monitor from 3305-2730 for ultrasound.

## 2023-05-08 NOTE — PROGRESS NOTES
Pt arrived to unit at this time via ambulance. Taken to TR3, urine sample collected, and EFM applied. Audible heart tones heard via ultrasound by RN. Maternal perception of fetal movement verified. Pt denies any vaginal bleeding but states she has a constant leaking of clear fluid yesterday. She also reports having constant \"cramping and aching\" in her lower abdomen. Ice chips and ice water provided. Call light in reach.

## 2023-05-08 NOTE — PROGRESS NOTES
RN to bedside. Pt laughing and talking with family on video call. Pt reports pain still an 8/10 but does not appear to be in pain. Pt ate sandwich, crackers, and jello and does not feel nauseous. Call light in reach instructed to call out with needs.

## 2023-05-08 NOTE — PROGRESS NOTES
Called Dr Salena Trevino and notified him of pt arrival, the conversation with Dr Barrera, pt complaint, vitals, reactive EFM, UA results, negative amnisure. Orders received for labs, IV fluids, and regular diet.

## 2023-05-08 NOTE — PROGRESS NOTES
Dr. Sher Beck to bedside- patient has appointment with him. To call with ultrasound update when results in.

## 2023-05-08 NOTE — DISCHARGE INSTRUCTIONS
Home Undelivered Discharge Instructions    After Discharge Orders:    No future appointments. Diet:  normal diet as tolerated    Rest: normal activity as tolerated    Other instructions: Do kick counts once a day on your baby. Choose the time of day your baby is most active. Get in a comfortable lying or sitting position and time how long it takes to feel 10 kicks, twists, turns, swishes, or rolls. Call your physician or midwife if there have not been 10 kicks in 1 hours    Call physician or midwife, return to Labor and Delivery, call 911, or go to the nearest Emergency Room if: increased leakage or fluid, decreased fetal movement, persistent low back pain or cramping, bleeding from vaginal area, difficulty urinating, pain with urination, difficulty breathing, new calf pain, persistent headache, or vision change.

## 2023-05-08 NOTE — PROGRESS NOTES
Called Dr Emiliano Villar as pt lists him as her provider and her first appointment is scheduled with him later this week. Informed him of pt complaint and situation including reactive EFM. Dr Emiliano Villar states that he is out of town and if the patient needs to be seen for any reason tonight he is unable to come so he would like me to contact the house physician who is Dr Jimmy Aguirre.

## 2023-05-08 NOTE — PROGRESS NOTES
RN to bedside, pt resting comfortably on the phone when I arrived. Then appeared to show that she was in pain. Plan of care for the night discussed and she verbalizes agreement.

## 2023-05-08 NOTE — PROGRESS NOTES
Pt called out with complaint of pain, RN to bedside. Pt reports hand being numb and wanting IV out. I assured her that the site looks fine and she is not complaining of pain when I palpate it. No symptoms of infiltration or redness or swelling. She agreed to let me shut the fluids off for a little and see if that helps. Medicated pt for pain per orders.

## 2023-05-08 NOTE — PROGRESS NOTES
Patient pain, EFM, contraction pattern as well as mentation, and all ultrasound results relayed to Dr. Neel Weinstein. Ok to discharge. Keep appointment in his office.

## 2023-05-10 LAB
GP B STREP SPEC QL CULT: ABNORMAL
ORGANISM: ABNORMAL

## 2023-06-03 ENCOUNTER — HOSPITAL ENCOUNTER (INPATIENT)
Age: 23
LOS: 1 days | Discharge: HOME | DRG: 566 | End: 2023-06-06
Attending: OBSTETRICS & GYNECOLOGY | Admitting: OBSTETRICS & GYNECOLOGY
Payer: COMMERCIAL

## 2023-06-03 ENCOUNTER — APPOINTMENT (OUTPATIENT)
Dept: ULTRASOUND IMAGING | Age: 23
DRG: 566 | End: 2023-06-03
Payer: COMMERCIAL

## 2023-06-03 LAB
ALBUMIN SERPL-MCNC: 3.1 G/DL (ref 3.5–5.2)
ALP SERPL-CCNC: 115 U/L (ref 35–104)
ALT SERPL-CCNC: 9 U/L (ref 0–32)
ANION GAP SERPL CALCULATED.3IONS-SCNC: 10 MMOL/L (ref 7–16)
AST SERPL-CCNC: 21 U/L (ref 0–31)
BACTERIA URNS QL MICRO: ABNORMAL /HPF
BASOPHILS # BLD: 0.02 E9/L (ref 0–0.2)
BASOPHILS NFR BLD: 0.2 % (ref 0–2)
BILIRUB SERPL-MCNC: 0.5 MG/DL (ref 0–1.2)
BILIRUB UR QL STRIP: NEGATIVE
BUN SERPL-MCNC: 8 MG/DL (ref 6–20)
CALCIUM SERPL-MCNC: 8.2 MG/DL (ref 8.6–10.2)
CHLORIDE SERPL-SCNC: 106 MMOL/L (ref 98–107)
CLARITY UR: CLEAR
CO2 SERPL-SCNC: 21 MMOL/L (ref 22–29)
COLOR UR: YELLOW
CREAT SERPL-MCNC: 0.4 MG/DL (ref 0.5–1)
CRYSTALS URNS MICRO: ABNORMAL /HPF
EOSINOPHIL # BLD: 0.03 E9/L (ref 0.05–0.5)
EOSINOPHIL NFR BLD: 0.3 % (ref 0–6)
EPI CELLS #/AREA URNS HPF: ABNORMAL /HPF
ERYTHROCYTE [DISTWIDTH] IN BLOOD BY AUTOMATED COUNT: 12.9 FL (ref 11.5–15)
GLUCOSE SERPL-MCNC: 81 MG/DL (ref 74–99)
GLUCOSE UR STRIP-MCNC: NEGATIVE MG/DL
HCT VFR BLD AUTO: 32.8 % (ref 34–48)
HGB BLD-MCNC: 11 G/DL (ref 11.5–15.5)
HGB UR QL STRIP: NEGATIVE
IMM GRANULOCYTES # BLD: 0.04 E9/L
IMM GRANULOCYTES NFR BLD: 0.5 % (ref 0–5)
KETONES UR STRIP-MCNC: NEGATIVE MG/DL
LEUKOCYTE ESTERASE UR QL STRIP: NEGATIVE
LYMPHOCYTES # BLD: 0.63 E9/L (ref 1.5–4)
LYMPHOCYTES NFR BLD: 7.2 % (ref 20–42)
MCH RBC QN AUTO: 27.5 PG (ref 26–35)
MCHC RBC AUTO-ENTMCNC: 33.5 % (ref 32–34.5)
MCV RBC AUTO: 82 FL (ref 80–99.9)
MONOCYTES # BLD: 0.54 E9/L (ref 0.1–0.95)
MONOCYTES NFR BLD: 6.2 % (ref 2–12)
MUCOUS THREADS URNS QL MICRO: PRESENT /LPF
NEUTROPHILS # BLD: 7.45 E9/L (ref 1.8–7.3)
NEUTS SEG NFR BLD: 85.6 % (ref 43–80)
NITRITE UR QL STRIP: NEGATIVE
PH UR STRIP: 5.5 [PH] (ref 5–9)
PLATELET # BLD AUTO: 209 E9/L (ref 130–450)
PMV BLD AUTO: 10.3 FL (ref 7–12)
POTASSIUM SERPL-SCNC: 4 MMOL/L (ref 3.5–5)
PROT SERPL-MCNC: 6.6 G/DL (ref 6.4–8.3)
PROT UR STRIP-MCNC: NEGATIVE MG/DL
RBC # BLD AUTO: 4 E12/L (ref 3.5–5.5)
RBC #/AREA URNS HPF: ABNORMAL /HPF (ref 0–2)
SODIUM SERPL-SCNC: 137 MMOL/L (ref 132–146)
SP GR UR STRIP: >=1.03 (ref 1–1.03)
UROBILINOGEN UR STRIP-ACNC: 0.2 E.U./DL
WBC # BLD: 8.7 E9/L (ref 4.5–11.5)
WBC #/AREA URNS HPF: ABNORMAL /HPF (ref 0–5)

## 2023-06-03 PROCEDURE — 76770 US EXAM ABDO BACK WALL COMP: CPT

## 2023-06-03 PROCEDURE — 6360000002 HC RX W HCPCS: Performed by: OBSTETRICS & GYNECOLOGY

## 2023-06-03 PROCEDURE — 96374 THER/PROPH/DIAG INJ IV PUSH: CPT

## 2023-06-03 PROCEDURE — 96360 HYDRATION IV INFUSION INIT: CPT

## 2023-06-03 PROCEDURE — 81001 URINALYSIS AUTO W/SCOPE: CPT

## 2023-06-03 PROCEDURE — 85025 COMPLETE CBC W/AUTO DIFF WBC: CPT

## 2023-06-03 PROCEDURE — 96361 HYDRATE IV INFUSION ADD-ON: CPT

## 2023-06-03 PROCEDURE — 6370000000 HC RX 637 (ALT 250 FOR IP): Performed by: OBSTETRICS & GYNECOLOGY

## 2023-06-03 PROCEDURE — 80053 COMPREHEN METABOLIC PANEL: CPT

## 2023-06-03 PROCEDURE — 36415 COLL VENOUS BLD VENIPUNCTURE: CPT

## 2023-06-03 RX ORDER — ONDANSETRON 2 MG/ML
4 INJECTION INTRAMUSCULAR; INTRAVENOUS EVERY 6 HOURS PRN
Status: DISCONTINUED | OUTPATIENT
Start: 2023-06-03 | End: 2023-06-06 | Stop reason: HOSPADM

## 2023-06-03 RX ORDER — SODIUM CHLORIDE, SODIUM LACTATE, POTASSIUM CHLORIDE, CALCIUM CHLORIDE 600; 310; 30; 20 MG/100ML; MG/100ML; MG/100ML; MG/100ML
INJECTION, SOLUTION INTRAVENOUS CONTINUOUS
Status: DISCONTINUED | OUTPATIENT
Start: 2023-06-03 | End: 2023-06-06 | Stop reason: HOSPADM

## 2023-06-03 RX ORDER — ACETAMINOPHEN 325 MG/1
650 TABLET ORAL EVERY 4 HOURS PRN
Status: DISCONTINUED | OUTPATIENT
Start: 2023-06-03 | End: 2023-06-06 | Stop reason: HOSPADM

## 2023-06-03 RX ORDER — MEPERIDINE HYDROCHLORIDE 25 MG/ML
25 INJECTION INTRAMUSCULAR; INTRAVENOUS; SUBCUTANEOUS ONCE
Status: COMPLETED | OUTPATIENT
Start: 2023-06-03 | End: 2023-06-03

## 2023-06-03 RX ORDER — OXYCODONE HYDROCHLORIDE 5 MG/1
5 TABLET ORAL EVERY 4 HOURS PRN
Status: DISCONTINUED | OUTPATIENT
Start: 2023-06-03 | End: 2023-06-06 | Stop reason: HOSPADM

## 2023-06-03 RX ADMIN — MEPERIDINE HYDROCHLORIDE 25 MG: 25 INJECTION, SOLUTION INTRAMUSCULAR; INTRAVENOUS; SUBCUTANEOUS at 14:21

## 2023-06-03 RX ADMIN — ONDANSETRON 4 MG: 2 INJECTION INTRAMUSCULAR; INTRAVENOUS at 15:09

## 2023-06-03 RX ADMIN — OXYCODONE HYDROCHLORIDE 5 MG: 5 TABLET ORAL at 17:39

## 2023-06-03 RX ADMIN — ONDANSETRON 4 MG: 2 INJECTION INTRAMUSCULAR; INTRAVENOUS at 22:08

## 2023-06-03 RX ADMIN — ACETAMINOPHEN 650 MG: 325 TABLET ORAL at 10:57

## 2023-06-03 RX ADMIN — OXYCODONE HYDROCHLORIDE 5 MG: 5 TABLET ORAL at 21:58

## 2023-06-03 ASSESSMENT — PAIN DESCRIPTION - LOCATION
LOCATION: ABDOMEN;BACK
LOCATION: ABDOMEN
LOCATION: ABDOMEN;BACK
LOCATION: ABDOMEN;BACK

## 2023-06-03 ASSESSMENT — PAIN DESCRIPTION - ORIENTATION
ORIENTATION: LOWER

## 2023-06-03 ASSESSMENT — PAIN SCALES - GENERAL
PAINLEVEL_OUTOF10: 8
PAINLEVEL_OUTOF10: 10
PAINLEVEL_OUTOF10: 10
PAINLEVEL_OUTOF10: 8

## 2023-06-03 ASSESSMENT — PAIN DESCRIPTION - DESCRIPTORS
DESCRIPTORS: DISCOMFORT;SORE
DESCRIPTORS: CRAMPING;DISCOMFORT
DESCRIPTORS: CRAMPING;DISCOMFORT
DESCRIPTORS: CRAMPING

## 2023-06-03 ASSESSMENT — PAIN - FUNCTIONAL ASSESSMENT
PAIN_FUNCTIONAL_ASSESSMENT: ACTIVITIES ARE NOT PREVENTED

## 2023-06-04 PROBLEM — O26.90 COMPLICATED PREGNANCY: Status: ACTIVE | Noted: 2023-06-04

## 2023-06-04 PROCEDURE — 96372 THER/PROPH/DIAG INJ SC/IM: CPT

## 2023-06-04 PROCEDURE — 99212 OFFICE O/P EST SF 10 MIN: CPT

## 2023-06-04 PROCEDURE — 6360000002 HC RX W HCPCS: Performed by: OBSTETRICS & GYNECOLOGY

## 2023-06-04 PROCEDURE — 96376 TX/PRO/DX INJ SAME DRUG ADON: CPT

## 2023-06-04 PROCEDURE — G0378 HOSPITAL OBSERVATION PER HR: HCPCS

## 2023-06-04 PROCEDURE — 6370000000 HC RX 637 (ALT 250 FOR IP): Performed by: OBSTETRICS & GYNECOLOGY

## 2023-06-04 PROCEDURE — 2580000003 HC RX 258: Performed by: OBSTETRICS & GYNECOLOGY

## 2023-06-04 RX ORDER — TERBUTALINE SULFATE 1 MG/ML
0.25 INJECTION, SOLUTION SUBCUTANEOUS EVERY 30 MIN PRN
Status: COMPLETED | OUTPATIENT
Start: 2023-06-04 | End: 2023-06-04

## 2023-06-04 RX ADMIN — SODIUM CHLORIDE, POTASSIUM CHLORIDE, SODIUM LACTATE AND CALCIUM CHLORIDE: 600; 310; 30; 20 INJECTION, SOLUTION INTRAVENOUS at 12:33

## 2023-06-04 RX ADMIN — TERBUTALINE SULFATE 0.25 MG: 1 INJECTION, SOLUTION SUBCUTANEOUS at 08:26

## 2023-06-04 RX ADMIN — OXYCODONE HYDROCHLORIDE 5 MG: 5 TABLET ORAL at 08:26

## 2023-06-04 RX ADMIN — TERBUTALINE SULFATE 0.25 MG: 1 INJECTION, SOLUTION SUBCUTANEOUS at 17:44

## 2023-06-04 RX ADMIN — TERBUTALINE SULFATE 0.25 MG: 1 INJECTION, SOLUTION SUBCUTANEOUS at 08:59

## 2023-06-04 RX ADMIN — OXYCODONE HYDROCHLORIDE 5 MG: 5 TABLET ORAL at 12:32

## 2023-06-04 RX ADMIN — OXYCODONE HYDROCHLORIDE 5 MG: 5 TABLET ORAL at 20:57

## 2023-06-04 RX ADMIN — OXYCODONE HYDROCHLORIDE 5 MG: 5 TABLET ORAL at 16:50

## 2023-06-04 RX ADMIN — ONDANSETRON 4 MG: 2 INJECTION INTRAMUSCULAR; INTRAVENOUS at 08:26

## 2023-06-04 RX ADMIN — WATER 1000 MG: 1 INJECTION INTRAMUSCULAR; INTRAVENOUS; SUBCUTANEOUS at 09:50

## 2023-06-04 RX ADMIN — TERBUTALINE SULFATE 0.25 MG: 1 INJECTION, SOLUTION SUBCUTANEOUS at 09:31

## 2023-06-04 RX ADMIN — OXYCODONE HYDROCHLORIDE 5 MG: 5 TABLET ORAL at 02:48

## 2023-06-04 RX ADMIN — WATER 1000 MG: 1 INJECTION INTRAMUSCULAR; INTRAVENOUS; SUBCUTANEOUS at 17:59

## 2023-06-04 ASSESSMENT — PAIN SCALES - GENERAL
PAINLEVEL_OUTOF10: 8
PAINLEVEL_OUTOF10: 10
PAINLEVEL_OUTOF10: 8
PAINLEVEL_OUTOF10: 9

## 2023-06-04 ASSESSMENT — PAIN - FUNCTIONAL ASSESSMENT
PAIN_FUNCTIONAL_ASSESSMENT: ACTIVITIES ARE NOT PREVENTED

## 2023-06-04 ASSESSMENT — PAIN DESCRIPTION - DESCRIPTORS
DESCRIPTORS: DISCOMFORT
DESCRIPTORS: CRAMPING
DESCRIPTORS: CRAMPING;SORE
DESCRIPTORS: CRAMPING

## 2023-06-04 ASSESSMENT — PAIN DESCRIPTION - LOCATION
LOCATION: ABDOMEN
LOCATION: ABDOMEN;BACK
LOCATION: BACK

## 2023-06-04 ASSESSMENT — PAIN DESCRIPTION - ORIENTATION
ORIENTATION: LOWER
ORIENTATION: LOWER

## 2023-06-05 PROBLEM — R82.71 ASYMPTOMATIC BACTERIURIA IN PREGNANCY: Status: ACTIVE | Noted: 2023-06-05

## 2023-06-05 PROBLEM — F41.9 ANXIETY AND DEPRESSION: Status: ACTIVE | Noted: 2023-06-05

## 2023-06-05 PROBLEM — O99.891 ASYMPTOMATIC BACTERIURIA IN PREGNANCY: Status: ACTIVE | Noted: 2023-06-05

## 2023-06-05 PROBLEM — N20.0 RENAL CALCULUS, BILATERAL: Status: ACTIVE | Noted: 2023-06-05

## 2023-06-05 PROBLEM — F32.A ANXIETY AND DEPRESSION: Status: ACTIVE | Noted: 2023-06-05

## 2023-06-05 PROBLEM — A08.4 GASTROENTERITIS AND COLITIS, VIRAL: Status: ACTIVE | Noted: 2023-06-05

## 2023-06-05 PROBLEM — Z3A.33 33 WEEKS GESTATION OF PREGNANCY: Status: ACTIVE | Noted: 2023-06-05

## 2023-06-05 PROCEDURE — 87449 NOS EACH ORGANISM AG IA: CPT

## 2023-06-05 PROCEDURE — 1220000001 HC SEMI PRIVATE L&D R&B

## 2023-06-05 PROCEDURE — 6370000000 HC RX 637 (ALT 250 FOR IP): Performed by: OBSTETRICS & GYNECOLOGY

## 2023-06-05 PROCEDURE — 6360000002 HC RX W HCPCS: Performed by: OBSTETRICS & GYNECOLOGY

## 2023-06-05 PROCEDURE — 87324 CLOSTRIDIUM AG IA: CPT

## 2023-06-05 PROCEDURE — 2580000003 HC RX 258: Performed by: OBSTETRICS & GYNECOLOGY

## 2023-06-05 PROCEDURE — 99254 IP/OBS CNSLTJ NEW/EST MOD 60: CPT | Performed by: INTERNAL MEDICINE

## 2023-06-05 PROCEDURE — G0378 HOSPITAL OBSERVATION PER HR: HCPCS

## 2023-06-05 RX ORDER — LOPERAMIDE HYDROCHLORIDE 2 MG/1
2 CAPSULE ORAL 4 TIMES DAILY PRN
Status: DISCONTINUED | OUTPATIENT
Start: 2023-06-05 | End: 2023-06-06 | Stop reason: HOSPADM

## 2023-06-05 RX ADMIN — SODIUM CHLORIDE, POTASSIUM CHLORIDE, SODIUM LACTATE AND CALCIUM CHLORIDE: 600; 310; 30; 20 INJECTION, SOLUTION INTRAVENOUS at 08:42

## 2023-06-05 RX ADMIN — OXYCODONE HYDROCHLORIDE 5 MG: 5 TABLET ORAL at 08:28

## 2023-06-05 RX ADMIN — SODIUM CHLORIDE, POTASSIUM CHLORIDE, SODIUM LACTATE AND CALCIUM CHLORIDE: 600; 310; 30; 20 INJECTION, SOLUTION INTRAVENOUS at 15:50

## 2023-06-05 RX ADMIN — ONDANSETRON 4 MG: 2 INJECTION INTRAMUSCULAR; INTRAVENOUS at 00:59

## 2023-06-05 RX ADMIN — LOPERAMIDE HYDROCHLORIDE 2 MG: 2 CAPSULE ORAL at 17:34

## 2023-06-05 RX ADMIN — ONDANSETRON 4 MG: 2 INJECTION INTRAMUSCULAR; INTRAVENOUS at 17:35

## 2023-06-05 RX ADMIN — SODIUM CHLORIDE, POTASSIUM CHLORIDE, SODIUM LACTATE AND CALCIUM CHLORIDE: 600; 310; 30; 20 INJECTION, SOLUTION INTRAVENOUS at 22:34

## 2023-06-05 RX ADMIN — SODIUM CHLORIDE, POTASSIUM CHLORIDE, SODIUM LACTATE AND CALCIUM CHLORIDE: 600; 310; 30; 20 INJECTION, SOLUTION INTRAVENOUS at 02:06

## 2023-06-05 RX ADMIN — ACETAMINOPHEN 650 MG: 325 TABLET ORAL at 20:04

## 2023-06-05 RX ADMIN — OXYCODONE HYDROCHLORIDE 5 MG: 5 TABLET ORAL at 23:47

## 2023-06-05 RX ADMIN — LOPERAMIDE HYDROCHLORIDE 2 MG: 2 CAPSULE ORAL at 08:38

## 2023-06-05 RX ADMIN — ACETAMINOPHEN 650 MG: 325 TABLET ORAL at 00:59

## 2023-06-05 RX ADMIN — WATER 1000 MG: 1 INJECTION INTRAMUSCULAR; INTRAVENOUS; SUBCUTANEOUS at 02:00

## 2023-06-05 RX ADMIN — ACETAMINOPHEN 650 MG: 325 TABLET ORAL at 08:28

## 2023-06-05 ASSESSMENT — PAIN DESCRIPTION - ORIENTATION
ORIENTATION: LOWER
ORIENTATION: MID

## 2023-06-05 ASSESSMENT — PAIN SCALES - GENERAL
PAINLEVEL_OUTOF10: 7
PAINLEVEL_OUTOF10: 10

## 2023-06-05 ASSESSMENT — PAIN DESCRIPTION - DESCRIPTORS: DESCRIPTORS: CRAMPING

## 2023-06-05 ASSESSMENT — PAIN - FUNCTIONAL ASSESSMENT: PAIN_FUNCTIONAL_ASSESSMENT: ACTIVITIES ARE NOT PREVENTED

## 2023-06-05 ASSESSMENT — PAIN DESCRIPTION - LOCATION
LOCATION: ABDOMEN
LOCATION: BACK

## 2023-06-05 NOTE — CONSULTS
hydronephrosis on ultrasound, not likely contributing to current symptoms  -Monitor urine output    4. Bacteruria   -per OB/GYN    5. Anxiety and depression  -no suicidal ideation or psychosis at this time   -recommend close outpatient follow up with PCP and behavioral health    Thank you very much for this interesting consult and we will continue to follow along. Feel free to call with any questions at [phone]      Electronically signed by João Penn DO on 6/5/2023 at 11:30 AM    NOTE: This report was transcribed using voice recognition software. Every effort was made to ensure accuracy; however, inadvertent computerized transcription errors may be present.

## 2023-06-06 VITALS
BODY MASS INDEX: 33.46 KG/M2 | WEIGHT: 196 LBS | HEART RATE: 79 BPM | DIASTOLIC BLOOD PRESSURE: 65 MMHG | SYSTOLIC BLOOD PRESSURE: 104 MMHG | RESPIRATION RATE: 16 BRPM | HEIGHT: 64 IN | TEMPERATURE: 98.3 F | OXYGEN SATURATION: 97 %

## 2023-06-06 LAB — C DIFF TOXIN/ANTIGEN: NORMAL

## 2023-06-06 PROCEDURE — 99232 SBSQ HOSP IP/OBS MODERATE 35: CPT | Performed by: INTERNAL MEDICINE

## 2023-06-06 PROCEDURE — 6370000000 HC RX 637 (ALT 250 FOR IP): Performed by: OBSTETRICS & GYNECOLOGY

## 2023-06-06 PROCEDURE — 2580000003 HC RX 258: Performed by: OBSTETRICS & GYNECOLOGY

## 2023-06-06 PROCEDURE — 6360000002 HC RX W HCPCS: Performed by: OBSTETRICS & GYNECOLOGY

## 2023-06-06 RX ADMIN — LOPERAMIDE HYDROCHLORIDE 2 MG: 2 CAPSULE ORAL at 01:48

## 2023-06-06 RX ADMIN — ACETAMINOPHEN 650 MG: 325 TABLET ORAL at 07:18

## 2023-06-06 RX ADMIN — ACETAMINOPHEN 650 MG: 325 TABLET ORAL at 14:42

## 2023-06-06 RX ADMIN — SODIUM CHLORIDE, POTASSIUM CHLORIDE, SODIUM LACTATE AND CALCIUM CHLORIDE: 600; 310; 30; 20 INJECTION, SOLUTION INTRAVENOUS at 04:40

## 2023-06-06 RX ADMIN — LOPERAMIDE HYDROCHLORIDE 2 MG: 2 CAPSULE ORAL at 07:18

## 2023-06-06 RX ADMIN — SODIUM CHLORIDE, POTASSIUM CHLORIDE, SODIUM LACTATE AND CALCIUM CHLORIDE: 600; 310; 30; 20 INJECTION, SOLUTION INTRAVENOUS at 12:44

## 2023-06-06 RX ADMIN — ONDANSETRON 4 MG: 2 INJECTION INTRAMUSCULAR; INTRAVENOUS at 04:35

## 2023-06-06 ASSESSMENT — PAIN DESCRIPTION - LOCATION
LOCATION: BACK
LOCATION: BACK;ABDOMEN

## 2023-06-06 ASSESSMENT — PAIN - FUNCTIONAL ASSESSMENT: PAIN_FUNCTIONAL_ASSESSMENT: ACTIVITIES ARE NOT PREVENTED

## 2023-06-06 ASSESSMENT — PAIN DESCRIPTION - DESCRIPTORS
DESCRIPTORS: ACHING;DISCOMFORT
DESCRIPTORS: CRAMPING;DISCOMFORT

## 2023-06-06 ASSESSMENT — PAIN SCALES - GENERAL
PAINLEVEL_OUTOF10: 8
PAINLEVEL_OUTOF10: 9

## 2023-06-06 NOTE — PLAN OF CARE
Problem: Pain  Goal: Verbalizes/displays adequate comfort level or baseline comfort level  6/6/2023 1730 by Jonathan Whitney RN  Outcome: Completed     Problem: Infection - Adult  Goal: Absence of infection at discharge  6/6/2023 1730 by Jonathan Whitney RN  Outcome: Completed     Problem: Infection - Adult  Goal: Absence of infection during hospitalization  6/6/2023 1730 by Jonathan Whitney RN  Outcome: Completed     Problem: Infection - Adult  Goal: Absence of fever/infection during anticipated neutropenic period  6/6/2023 1730 by Jnoathan Whitney RN  Outcome: Completed     Problem: Safety - Adult  Goal: Free from fall injury  6/6/2023 1730 by Jonathan Whitney RN  Outcome: Completed     Problem: Discharge Planning  Goal: Discharge to home or other facility with appropriate resources  6/6/2023 1730 by Jonathan Whitney RN  Outcome: Completed

## 2023-06-07 NOTE — PROGRESS NOTES
Abdominal pain improving, patient did not need any opiates analgesics since midnight. Mainly c/o back pain  No contractions detected on tocography. No further c/o diarrhea. Agrees to be discharged to home.   Office follow up in 1 week
Assumed care of patient at this time. Plan of Care discussed with understanding verbalized by patient. VS and assessment to be completed. Will continue monitoring.
Assumed care of patient for 11pm - 7am shift. All needs addressed at this time. Call light with in reach.
Assumed care of patient for 7 pm - 7 am shift. Plan of care discussed and agreed upon. All needs addressed at this time. Call light with in reach.
Assumed care of pt at this time. Plan of care discussed. Pt verbalized understanding. No other concerns expressed at this time. Call light within reach.
Assumed care of pt. Pt c/o abd pain. Abd soft, non-tender. Awaiting breakfast for her meds.
Attempted to arrange taxi service utilizing the voucher. Everything is closed, or does not accept voucher. Contacted [facility], and they will come  patient. Will call the unit directly when they arrive outside the front door.
Discharge instructions given, patient verbalizes understanding. Patient in need of transportation home, this RN called the nursing supervisor and awaiting transportation voucher.
Dr Jamia Snider called for an update- updated on pain status- FHR and no contractions
Dr Keshav Nicholson notified of patient's BM in bed, new orders received.
Dr. Av Bearden returned call. Informed of pt's HX and S/S. Will be in to see her. Instructions given, try to collect stool specimen, no further order's received.
Dr. Jamia Snider at the bedside to evaluate patient.
Dr. Ryan Sheridan called in, informed of pt's status, stool sent and EFM, difficulty tracing FH and rare ctx. Order received.
Dr. Zachery Sidhu at bedside, discussed plan of care and [place]. Order's received. Message left for Dr. Shelia Anand regarding consult.
EFM , FHR not tracing at times, RN adjusted ultrasound then pt curls up on her side again sleeping.  Will update MD.
Message left for Dr. Deb Pete.
Patient ride arrived, patient left unit accompanied by LPN.
Patient sitting up to eat from [id number].
Pt awake, did not eat lunch. \"I was tired\". [place] given and instructed to order dinnerr. EFM off. Will call for imodium if needed.
Pt states she is still having diarrhea, denies nausea or vomiting. Imodium given per order.
Up to bathroom to shower from [id number].
Up to bathroom to void from [id number].
Up to bathroom to void from [id number]
Up to bathroom to void from 7632-6696
   MPV 10.3           Radiology:   US RETROPERITONEAL COMPLETE   Final Result   Nonobstructing bilateral renal calculi. No hydronephrosis. Assessment and Plan:  Principal Problem:    Complicated pregnancy  Active Problems:    Gastroenteritis and colitis, viral    Anxiety and depression    Renal calculus, bilateral    Asymptomatic bacteriuria in pregnancy    33 weeks gestation of pregnancy  Resolved Problems:    * No resolved hospital problems. *      Gastroenteritis and colitis  -Most likely viral etiology, but it is reasonable to check for C. difficile given multiple recent hospital encounters noted on chart review. Sample collected and is in process  -Agree with IV fluid hydration LR at 150 mL/h, antiemetics and antidiarrheals as needed     2. Complicated pregnancy  -Management per primary service OB/GYN     3.  Bilateral renal calculi  -No obstruction or hydronephrosis on ultrasound, not likely contributing to current symptoms  -Monitor urine output     4. Bacteruria   -per OB/GYN     5. Anxiety and depression  -no suicidal ideation or psychosis at this time   -recommend close outpatient follow up with PCP and behavioral health       NOTE: This report was transcribed using voice recognition software. Every effort was made to ensure accuracy; however, inadvertent computerized transcription errors may be present.      Electronically signed by Gume Cross DO on 6/6/2023 at 7:36 AM

## 2023-06-16 ENCOUNTER — HOSPITAL ENCOUNTER (OUTPATIENT)
Age: 23
Discharge: HOME | End: 2023-06-16
Attending: OBSTETRICS & GYNECOLOGY | Admitting: OBSTETRICS & GYNECOLOGY
Payer: COMMERCIAL

## 2023-06-16 VITALS
SYSTOLIC BLOOD PRESSURE: 123 MMHG | TEMPERATURE: 97.8 F | RESPIRATION RATE: 18 BRPM | HEART RATE: 93 BPM | DIASTOLIC BLOOD PRESSURE: 68 MMHG

## 2023-06-16 PROBLEM — Z3A.35 35 WEEKS GESTATION OF PREGNANCY: Status: ACTIVE | Noted: 2023-06-16

## 2023-06-16 PROCEDURE — 99211 OFF/OP EST MAY X REQ PHY/QHP: CPT

## 2023-06-16 NOTE — PROGRESS NOTES
Patient educated on kick counts. Instructed on contractions versus damon hernández, leaking of fluid and reasons to call Dr or come to hospital. Patient verbalizes understanding. Discharged ambulatory off unit in stable condition with  from [facility] with instructions.

## 2023-06-16 NOTE — PROGRESS NOTES
Patient received from ambulance transport from [facility] with c/o abdominal pain which started on Monday after Dr visit and became worse last night and was occurring in back. [facility]. Urine speci obtained, monitors applied. FHTs 135. Uterine irritability,mild noted. Admission scut started. Call light at bedside. Manuel Ybarra,  at bedside for support with patient permission.

## 2023-06-16 NOTE — DISCHARGE INSTRUCTIONS
good choice. If you have not exercised in the past, start out slowly. Take several short walks each day. Do not smoke. If you need help quitting, talk to your doctor about stop-smoking programs. These can increase your chances of quitting for good. Do not touch cat feces or litter boxes. Also, wash your hands after you handle raw meat, and fully cook all meat before you eat it. Wear gloves when you work in the yard or garden, and wash your hands well when you are done. Cat feces, raw or undercooked meat, and contaminated dirt can cause an infection that may harm your baby or lead to a miscarriage. Avoid things that can make your body too hot and may be harmful to your baby, such as a hot tub or sauna. Or talk with your doctor before doing anything that raises your body temperature. Your doctor can tell you if it's safe. Avoid chemical fumes, paint fumes, or poisons. Do not use illegal drugs, marijuana, or alcohol. Medicines    Review all of your medicines with your doctor. Some of your routine medicines may need to be changed to protect your baby. Use acetaminophen (Tylenol) to relieve minor problems, such as a mild headache or backache or a mild fever with cold symptoms. Do not use nonsteroidal anti-inflammatory drugs (NSAIDs), such as ibuprofen (Advil, Motrin) or naproxen (Aleve), unless your doctor says it is okay. Do not take two or more pain medicines at the same time unless the doctor told you to. Many pain medicines have acetaminophen, which is Tylenol. Too much acetaminophen (Tylenol) can be harmful. Take your medicines exactly as prescribed. Call your doctor if you think you are having a problem with your medicine. To manage morning sickness    If you feel sick when you first wake up, try eating a small snack (such as crackers) before you get out of bed. Allow some time to digest the snack, and then get out of bed slowly.      Do not skip meals or go for long periods

## 2023-07-01 ENCOUNTER — NURSE TRIAGE (OUTPATIENT)
Dept: OTHER | Facility: CLINIC | Age: 23
End: 2023-07-01

## 2023-07-01 ENCOUNTER — HOSPITAL ENCOUNTER (OUTPATIENT)
Age: 23
Discharge: HOME | End: 2023-07-02
Attending: OBSTETRICS & GYNECOLOGY | Admitting: OBSTETRICS & GYNECOLOGY
Payer: COMMERCIAL

## 2023-07-01 VITALS
TEMPERATURE: 98.1 F | RESPIRATION RATE: 18 BRPM | HEART RATE: 82 BPM | SYSTOLIC BLOOD PRESSURE: 113 MMHG | DIASTOLIC BLOOD PRESSURE: 56 MMHG

## 2023-07-01 LAB
AMORPH SED URNS QL MICRO: ABNORMAL
BACTERIA URNS QL MICRO: ABNORMAL /HPF
BILIRUB UR QL STRIP: NEGATIVE
CLARITY UR: ABNORMAL
COLOR UR: YELLOW
EPI CELLS #/AREA URNS HPF: ABNORMAL /HPF
GLUCOSE UR STRIP-MCNC: NEGATIVE MG/DL
HGB UR QL STRIP: NEGATIVE
KETONES UR STRIP-MCNC: NEGATIVE MG/DL
LEUKOCYTE ESTERASE UR QL STRIP: ABNORMAL
NITRITE UR QL STRIP: NEGATIVE
PH UR STRIP: 7 [PH] (ref 5–9)
PROT UR STRIP-MCNC: NEGATIVE MG/DL
RBC #/AREA URNS HPF: ABNORMAL /HPF (ref 0–2)
SP GR UR STRIP: 1.01 (ref 1–1.03)
UROBILINOGEN UR STRIP-ACNC: 1 E.U./DL
WBC #/AREA URNS HPF: ABNORMAL /HPF (ref 0–5)

## 2023-07-01 PROCEDURE — 81001 URINALYSIS AUTO W/SCOPE: CPT

## 2023-07-02 ENCOUNTER — HOSPITAL ENCOUNTER (OUTPATIENT)
Age: 23
Discharge: HOME | End: 2023-07-03
Attending: OBSTETRICS & GYNECOLOGY | Admitting: OBSTETRICS & GYNECOLOGY
Payer: COMMERCIAL

## 2023-07-02 PROCEDURE — 2580000003 HC RX 258: Performed by: OBSTETRICS & GYNECOLOGY

## 2023-07-02 PROCEDURE — 96360 HYDRATION IV INFUSION INIT: CPT

## 2023-07-02 PROCEDURE — 99211 OFF/OP EST MAY X REQ PHY/QHP: CPT

## 2023-07-02 PROCEDURE — 96361 HYDRATE IV INFUSION ADD-ON: CPT

## 2023-07-02 RX ORDER — ARIPIPRAZOLE 5 MG/1
5 TABLET ORAL DAILY
COMMUNITY

## 2023-07-02 RX ORDER — SODIUM CHLORIDE, SODIUM LACTATE, POTASSIUM CHLORIDE, CALCIUM CHLORIDE 600; 310; 30; 20 MG/100ML; MG/100ML; MG/100ML; MG/100ML
INJECTION, SOLUTION INTRAVENOUS CONTINUOUS
Status: DISCONTINUED | OUTPATIENT
Start: 2023-07-02 | End: 2023-07-02 | Stop reason: HOSPADM

## 2023-07-02 RX ORDER — SODIUM CHLORIDE, SODIUM LACTATE, POTASSIUM CHLORIDE, AND CALCIUM CHLORIDE .6; .31; .03; .02 G/100ML; G/100ML; G/100ML; G/100ML
500 INJECTION, SOLUTION INTRAVENOUS ONCE
Status: COMPLETED | OUTPATIENT
Start: 2023-07-02 | End: 2023-07-02

## 2023-07-02 RX ADMIN — SODIUM CHLORIDE, POTASSIUM CHLORIDE, SODIUM LACTATE AND CALCIUM CHLORIDE 500 ML: 600; 310; 30; 20 INJECTION, SOLUTION INTRAVENOUS at 00:15

## 2023-07-02 ASSESSMENT — PAIN DESCRIPTION - DESCRIPTORS: DESCRIPTORS: CRAMPING

## 2023-07-03 VITALS
SYSTOLIC BLOOD PRESSURE: 116 MMHG | TEMPERATURE: 98.2 F | HEART RATE: 83 BPM | DIASTOLIC BLOOD PRESSURE: 62 MMHG | BODY MASS INDEX: 32.65 KG/M2 | WEIGHT: 196 LBS | RESPIRATION RATE: 16 BRPM | HEIGHT: 65 IN

## 2023-07-03 VITALS
RESPIRATION RATE: 16 BRPM | TEMPERATURE: 97.7 F | SYSTOLIC BLOOD PRESSURE: 117 MMHG | HEART RATE: 85 BPM | DIASTOLIC BLOOD PRESSURE: 67 MMHG

## 2023-07-03 PROBLEM — O47.9 IRREGULAR CONTRACTIONS: Status: ACTIVE | Noted: 2023-07-03

## 2023-07-03 LAB
AMNISURE, POC: NEGATIVE
AMNISURE, POC: NEGATIVE
Lab: NORMAL
Lab: NORMAL
NEGATIVE QC PASS/FAIL: NORMAL
NEGATIVE QC PASS/FAIL: NORMAL
POSITIVE QC PASS/FAIL: NORMAL
POSITIVE QC PASS/FAIL: NORMAL
RPR SER QL: NORMAL

## 2023-07-03 PROCEDURE — 99211 OFF/OP EST MAY X REQ PHY/QHP: CPT

## 2023-07-03 PROCEDURE — 96361 HYDRATE IV INFUSION ADD-ON: CPT

## 2023-07-03 PROCEDURE — 87591 N.GONORRHOEAE DNA AMP PROB: CPT

## 2023-07-03 PROCEDURE — 84112 EVAL AMNIOTIC FLUID PROTEIN: CPT

## 2023-07-03 PROCEDURE — 2580000003 HC RX 258: Performed by: OBSTETRICS & GYNECOLOGY

## 2023-07-03 PROCEDURE — 87081 CULTURE SCREEN ONLY: CPT

## 2023-07-03 PROCEDURE — 96360 HYDRATION IV INFUSION INIT: CPT

## 2023-07-03 PROCEDURE — 86592 SYPHILIS TEST NON-TREP QUAL: CPT

## 2023-07-03 PROCEDURE — 36415 COLL VENOUS BLD VENIPUNCTURE: CPT

## 2023-07-03 PROCEDURE — 87491 CHLMYD TRACH DNA AMP PROBE: CPT

## 2023-07-03 RX ORDER — SODIUM CHLORIDE, SODIUM LACTATE, POTASSIUM CHLORIDE, AND CALCIUM CHLORIDE .6; .31; .03; .02 G/100ML; G/100ML; G/100ML; G/100ML
500 INJECTION, SOLUTION INTRAVENOUS ONCE
Status: COMPLETED | OUTPATIENT
Start: 2023-07-03 | End: 2023-07-03

## 2023-07-03 RX ADMIN — SODIUM CHLORIDE, POTASSIUM CHLORIDE, SODIUM LACTATE AND CALCIUM CHLORIDE 500 ML: 600; 310; 30; 20 INJECTION, SOLUTION INTRAVENOUS at 00:45

## 2023-07-03 NOTE — PROGRESS NOTES
Ambar Landry is at the bedside and gave orders to d/c the patient and he stated the patient can go home now.

## 2023-07-03 NOTE — PROGRESS NOTES
38w0d presents to unit with LOF tht happened around 2300. Pt states the fluid is clear. Endorses having ctxs as well. Denies vb. Perceives +FM.

## 2023-07-03 NOTE — PROGRESS NOTES
at 37w6d here for possible ROM and lower abdominal pain about 2300, for lear fluid, pain than comes and goes. Hx anx/depression. Still resides at [facility].

## 2023-07-03 NOTE — PROGRESS NOTES
Prasanth Pavon stated to keep the patient on the monitor for 20 minutes, when reactive to have the patient leave

## 2023-07-03 NOTE — DISCHARGE INSTRUCTIONS
Home Undelivered Discharge Instructions    After Discharge Orders:    No future appointments. Call physician or midwife's office on *** for instructions. Diet:  normal diet as tolerated    Rest: normal activity as tolerated    Other instructions: Do kick counts once a day on your baby. Choose the time of day your baby is most active. Get in a comfortable lying or sitting position and time how long it takes to feel 10 kicks, twists, turns, swishes, or rolls. Call your physician or midwife if there have not been 10 kicks in 2 hours    Call physician or midwife, return to Labor and Delivery, call 911, or go to the nearest Emergency Room if: increased leakage or fluid, decreased fetal movement, persistent low back pain or cramping, bleeding from vaginal area, difficulty urinating, pain with urination, difficulty breathing, new calf pain, persistent headache, or vision change or contractions         Week 38 of Your Pregnancy: Care Instructions  Believe it or not, your baby is almost here. You may notice how your baby responds to sounds, warmth, cold, and light. You may even know what kind of music your baby likes. Even if you expect a vaginal birth, it's a good idea to learn about  section ().  is the delivery of a baby through a cut (incision) in your belly. It's a major surgery, so it has more risks than a vaginal birth. During the first 2 weeks after the birth, limit visitors. Don't allow visitors who have colds or infections. Ask visitors to wash their hands before they touch your baby. And never let anyone smoke around your baby. Know about unplanned C-sections. Reasons for an unplanned  include labor that slows or stops, signs of distress in your baby, and high blood pressure or other problems for you. Know about planned C-sections. If your baby isn't in a head-down position for delivery (breech position), your doctor may plan a .  Or you may

## 2023-07-06 LAB — GP B STREP SPEC QL CULT: NORMAL

## 2023-07-07 LAB
C TRACH DNA SPEC QL NAA+PROBE: NEGATIVE
N GONORRHOEA DNA SPEC QL NAA+PROBE: NEGATIVE
SPECIMEN SOURCE: NORMAL

## 2023-09-07 VITALS
HEIGHT: 66 IN | OXYGEN SATURATION: 99 % | RESPIRATION RATE: 18 BRPM | HEART RATE: 84 BPM | WEIGHT: 279.98 LBS | DIASTOLIC BLOOD PRESSURE: 54 MMHG | BODY MASS INDEX: 45 KG/M2 | SYSTOLIC BLOOD PRESSURE: 102 MMHG | TEMPERATURE: 98.1 F

## 2023-09-07 VITALS
BODY MASS INDEX: 29.76 KG/M2 | HEART RATE: 83 BPM | TEMPERATURE: 98.4 F | HEIGHT: 66 IN | OXYGEN SATURATION: 98 % | SYSTOLIC BLOOD PRESSURE: 103 MMHG | WEIGHT: 185.19 LBS | DIASTOLIC BLOOD PRESSURE: 61 MMHG | RESPIRATION RATE: 16 BRPM

## 2023-09-08 VITALS
BODY MASS INDEX: 27.99 KG/M2 | DIASTOLIC BLOOD PRESSURE: 84 MMHG | RESPIRATION RATE: 16 BRPM | HEIGHT: 66 IN | HEART RATE: 120 BPM | TEMPERATURE: 97.5 F | SYSTOLIC BLOOD PRESSURE: 123 MMHG | OXYGEN SATURATION: 96 % | WEIGHT: 174.16 LBS

## 2023-09-14 NOTE — PROGRESS NOTES
Current Stage:   Stage: Triage     Subjective Data:   Antepartum:  Vaginal Bleeding: No   Contractions/Abdominal Pain: Yes   Discharge/Loss of Fluid: No   Fetal Movement: None   Fevers/Chills: No   Preeclampsia Symptoms: No   Antepartum:    21 yo  at 16.4wga by 11.5 wk US at [facility], presents with lower abdominal cramping and N/V x past 24 hrs.     Pt was seen on  at [facility] for UTI symptoms and receives care at [facility]. Pt's UA yesterday showed + nitrites, was given Macrobid which she started this am after she vomited. Denies F/C. Denies VB, vaginal itching, irritation, or odor. Pt reports lower abdominal  cramping became worse this morning. She has been taking AZO and thought her urine had blood in it today.     Pregnancy notable for:  - LISSY, sober 5 months from crack/cocaine. Living in [facility]. Utox negative on   - CT and trich + on  and treated, trich MARILOU neg, no CT MARILOU, sent on 2/3  - UTI at 7wga; pan-sens E coli, - 3 UTIs this pregnancy; suppression?  - depression/PTSD; hospitalized for SI/HI this preg. Currently taking Zoloft 50 mg daily.     OBHx: reports 4 early SABs  GYN hx:  recent h/o of trich and CT  PMH: denies  PSH: wisdom teeth, gallbladder  Fam hx: non-contributory  Meds: PNV, Zoloft, Macrobid  All: PCN, Sulfa  Social hx: denies t/e/d. Currently lives in [facility]. Feels safe there.           Objective Information:    Objective Information:      T   P  R  BP   MAP  SpO2   Value  36  81  16  115/67   86  98%  Date/Time 2/3 11:35 2/3 12:22 3 14:20 3 11:43  23 11:43 23 14:20  Range  (36C - 36.4C )  (81 - 120 )  (16 - 16 )  (115 - 123 )/ (67 - 84 )  (86 - 86 )  (87% - 100% )      Pain reported at 2/3 14:20: 0 = None      Physical Exam:   Constitutional: alert, oriented x3, conversational   Obstetric: Doptones 141  SVE: closed/long/high   Eyes: Sclera white, EOM intact, no discharge or erythema   ENMT: No hearing deficit, no goiter present   Head/Neck:  Normocephalic, atraumatic, full range  of motion intact   Respiratory/Thorax: normal respiratory effort   Gastrointestinal: soft, gravid, non-tender   Genitourinary: No flank pain bilaterally  Urine dip + nitrites, UA negative blood   Musculoskeletal: Grossly WNL   Extremities: No edema, discoloration, or pain in  BLE   Neurological: Speech clear, no deficits noted   Psychological: Appropriate mood, behavior. Calm,  cooperative.   Skin: no rashes or lesions     Recent Lab Results:    Results:    CBC: 2/3/2023 13:17              \     Hgb     /                              \     11.3 L    /  WBC  ----------------  Plt               6.2       ----------------    233              /     Hct     \                              /     34.7 L    \            RBC: 4.09     MCV: 85     Neutrophil %: 74.9      CMP: 2/3/2023 12:10  NA+        Cl-     BUN  /                         Canceled    Canceled    Canceled  /  --------------------------------  Glucose                ---------------------------  Canceled    K+     HCO3-   Creat \                         Canceled    Canceled    Canceled  \           \  T Bili  /                    \  Canceled  /  AST  x ---- x ALT        Canceled x ---- x Canceled Patients treated with Sulfasalazine may generate    falsely decreased results for ALT.         /  Alk P   \               /  Canceled  \  Calcium : Canceled     Anion Gap : Canceled     Albumin : Canceled     T Protein : Canceled           Assessment and Plan:   Assessment:    23 yo  at 16.4wga by 11.5 wk US at [facility], presents with lower abdominal cramping and N/V x past 24 hrs.     Abdominal Pain/Nausea/Vomiting  - seen at [facility] on  and prescribed Macrobid for UTI (+nitrites on UA)  - high suspicion for UTI, pt started Macrobid this am after vomiting  - pt here for N/V,  blood in urine, last took AZO at 0630  - afebrile, no flank pain  - UA negative for blood, culture sent  - GC/CT sent  - CBC wnl  - LR bolus, IV  Zofran, Tylenol given with resolution of symptoms  - SVE closed/long/high, no suspicion for PTL at this time  - discussed warning signs/when to return for eval, pt verb understanding  - Tylenol and Zofran scripts sent  - encouraged BRAT diet and increased hydration    Maternal Well-being  - Vital signs stable and WNL  - Emotional support and reassurance provided  - All questions and concerns addressed  - encouraged to keep appt as scheduled on 2/24 at [facility] or sooner if needed  - SAFET low risk    Fetal Well Being  - Doptones 141    Dispo: Home with precautions  Plan discussed with Dr. Prescott and agrees with d/c home.  Jackie Powers, MSN, APRN, WHNP-BC                Plan of Care Reviewed With:  Plan of Care Reviewed With: patient     Attestation:   Note Completion:  I am a:  Advanced Practice Provider   Attending Only - Shared Visit with Advanced Practice Provider This is a shared visit.  I have reviewed the Advanced Practice Provider?s encounter note, approve the Advanced Practice Provider?s documentation,  and provide the following additional information from my personal encounter.    Comments/ Additional Findings    16 weeks, known UTI with only one dose of macrobid taken.  Presents with N/V.  No CVA tenderness and normal white count.  Pt tolerated by mouth challenge  after getting IV fluids and Zofran.  Ok for discharge.  Return precautions discussed  Conor Prescott MD          Electronic Signatures:  Conor Prescott)  (Signed 03-Feb-2023 15:50)   Authored: Note Completion   Co-Signer: Current Stage, Subjective Data, Objective Data, Assessment and Plan, Note Completion  Jackie Powers (APRN-CNP)  (Signed 03-Feb-2023 14:50)   Authored: Current Stage, Subjective Data, Objective Data,  Assessment and Plan, Note Completion      Last Updated: 03-Feb-2023 15:50 by Conor Prescott)

## 2023-09-14 NOTE — PROGRESS NOTES
Current Stage:   Stage: Triage     Subjective Data:   Antepartum:  Vaginal Bleeding: No   Contractions/Abdominal Pain: Yes   Discharge/Loss of Fluid: No   Fetal Movement: Good   Fevers/Chills: No   Preeclampsia Symptoms: No   Antepartum:    21 yo  at 20.6wga by 11.5 wk US at [facility], presents with abdominal pain and nausea. Patient reports experiencing abdominal cramping low in her pelvis, concerning for contractions.  Shortly after contractions started, pt reports experiencing nausea. Denies all other infectious symptoms, bowel changes, dysuria and hematuria. Pt states that she has already started experiencing fetal movement. denies VB and LOF.     Pregnancy notable for:  - LISSY, sober 5 months from crack/cocaine. Living in [facility]. Utox negative on   - CT and trich + on  and treated, trich MARILOU neg, no CT MARILOU, sent on 2/3  - UTI at 7wga; pan-sens E coli, - 3 UTIs this pregnancy; suppression?  - depression/PTSD; hospitalized for SI/HI this preg. Currently taking Zoloft 50 mg daily.     OBHx: reports 4 early SABs  GYN hx:  recent h/o of trich and CT  PMH: denies  PSH: wisdom teeth, gallbladder  Fam hx: non-contributory  Meds: PNV, Zoloft, Macrobid  All: PCN, Sulfa  Social hx: denies t/e/d. Currently lives in [facility]. Feels safe there.               Objective Information:    Objective Information:      T   P  R  BP   MAP  SpO2   Value  36.7  83  16  98/64   75  100%  Date/Time 3/5 7:13 3/5 10:16 3/5 7:13 3/5 10:16  3/5 10:16 3/5 10:11  Range  (36.7C - 36.7C )  (61 - 86 )  (16 - 18 )  (85 - 113 )/ (42 - 64 )  (60 - 75 )  (87% - 100% )      Physical Exam:   Constitutional: no signs of acute distress   Obstetric:    Head/Neck: neck supple   Respiratory/Thorax: Even and unlabored on RA   Cardiovascular: warm and well perfused, HDS   Gastrointestinal: gravid, no tenderness to palpation   Extremities: no calf tenderness, BLE symmetrical,  minimal edema   Psychological:  good mood, appropriate affect   Skin: No rashes/lesions/erythema     Assessment and Plan:   Assessment:    21 yo  at 20.6wga by 11.5 wk US at [facility], presents with abdominal pain and nausea.     R/o previable  labor  - Upon presentation to OB triage, patient reporting painful contractions q2-3 mins apart  - SVE on presentation 0/0/-3. Cervix 3.8cm and closed without funneling on first trimester u/s.   - Contractions completely resolved after PO hydration, therefore repeat SVE deferred     - At the time of discharge, patient was given strict return precautions and expressed understanding.    Nausea   - Patient reporting nausea in s/o painful contractions  - Resolved shortly after presentation   - No other infectious symptoms denies sick contacts , vitals wnl   - Pt tolerating PO intake prior to discharge     Fetal well being   - FHR wnl on bedside doppler   - Patient reporting good fetal movement   - Encouraged to continue daily assessments of fetal movement        Discussed plan with and seen by Dr. Matt Babcock MD, PGY1     Attestation:   Note Completion:  I am a:  Resident/Fellow   Attending Attestation I saw and evaluated the patient.  I personally obtained the key and critical portions of the history and physical exam or was physically present for key and  critical portions performed by the resident/fellow. I reviewed the resident/fellow?s documentation and discussed the patient with the resident/fellow.  I agree with the resident/fellow?s medical decision making as documented in the note.     I personally evaluated the patient on 05-Mar-2023         Electronic Signatures:  Amy Gutierrez (MD)  (Signed 15-Mar-2023 11:34)   Authored: Note Completion   Co-Signer: Current Stage, Subjective Data, Objective Data, Assessment and Plan, Note Completion  Angeline Babcock (Resident))  (Signed 05-Mar-2023 18:58)   Authored: Current Stage, Subjective Data, Objective Data,  Assessment and Plan,  Note Completion      Last Updated: 15-Mar-2023 11:34 by Amy Gutierrez)

## 2023-09-14 NOTE — PROGRESS NOTES
Current Stage:   Stage: Triage     Subjective Data:   Antepartum:  Antepartum:    23 yo  at 24.6wga by 11.5 wk US at [facility] p/f pelvic pressure x1 day. Started after having intercourse for 2nd time this pregnancy. Reports some yellow-green mucus discharge also  x1 day. Denies VB, LOF. GFM. Denies N/V, CP, SOB, changes in bowel habits. Denies vaginal itching.    Pregnancy notable for:  - LISSY, sober 6 months from crack/cocaine. Previously living in [facility] [place]. Utox negative on   - Housing instability: currently living in hotel rooms, no home. Reports she is living alone. Reports that she feels safe. Pt's mother pays for her hotels.  - CT and trich + on  and treated, trich MARILOU neg, no CT MARILOU, sent on 2/3   - UTI at 7wga; pan-sens E coli, - 3 UTIs this pregnancy, MARILOU 2/3 positive, susceptible to macrobid. Documented that she had 3 other UTIs in pregnancy.   After that triage visit, unable to get a hold of patient after multiple attempts at calling. Macrobid 100mg BID x5 days sent to pharmacy as well as daily Macrobid rx suppression sent. Pt did not take.  - depression/PTSD; hospitalized for SI/HI this preg. Currently denies these symptoms. Currently taking Zoloft 50 mg daily.     OBHx: reports 4 first tri SABs  GYN hx:  recent h/o of trich and CT as above  PMH: denies  PSH: wisdom teeth, lap garret (at age 20)  Fam hx: reviewed, non-contributory  Meds: PNV, Zoloft  All: PCN, Sulfa (hives)  Social hx: denies t/e/d during this pregnancy. Housing instability as above.        Objective Information:    Objective Information:      T   P  R  BP   MAP  SpO2   Value  36.9  74  18  108/58   79  91%  Date/Time  18:26  19:28  18:26  18:26   18:26  19:28  Range  (36.9C - 36.9C )  (74 - 95 )  (16 - 18 )  (103 - 108 )/ (58 - 61 )  (79 - 79 )  (89% - 100% )  Highest temp of 36.9 C was recorded at  17:58      Pain reported at  20:43: 5 = Moderate      Physical Exam:   Constitutional:  NAD, sitting up in bed   Obstetric: cEFM: baseline 140s, mod variability,  + accels, - decels  toco: acontractile  SVE: 0/0/-3  SSE: scant white mucus around cervix, visually closed, no blood in vault. Neg for pooling/ferning/nitrazine   Eyes: sclerae clear   Head/Neck: atraumatic   Respiratory/Thorax: normal resp effort on RA   Cardiovascular: well perfused   Gastrointestinal: Abdomen tender in suprapubic region,  otherwise nontender to palpation, no rebound or guarding   Neurological: grossly intact   Psychological: appropriate affect      Testing:   NST Interpretation - Baby A:  ·  Baseline    ·  Variability moderate (amplitude range 6 to 25 bpm)   ·  Interpretation Reactive (2 15x15 accels)   ·  Accelerations +   ·  Decelerations -     Assessment and Plan:   Assessment:    23 yo  at 24.6wga by 11.5 wk US at [facility] p/f pelvic pressure, mucus discharge x1 day.    Pelvic pressure, UTI  - etiology likely musculoskeletal discomforts from pregnancy, likely exacerbated by preceding sexual intercourse  - cervix closed/long, PTL unlikley  - no CVAT or fevers concerning for pyelo. However pt does have untreated recent UCx. Will give 1 dose of macrobid inpatient and rx for homegoing (5 days of treatment + suppression). Repeat UCx also ordered.  - encouraged heat packs, tylenol as needed  - PTL precautions reviewed    Discharge  - most likley physiologic discharge of pregnancy as it is scant and clear/white  - Wet prep, GC/CT ordered. Will f/u with pt if abnormal.  - PPROM unlikely as neg for ferning/pooling/nitrazine on exam    Housing instability  - Referred to SW, and pt given resources for housing    Routine prenatal care  - Recommended patient follow up with OB provider as scheduled    Discussed with and seen by Dr. Janell Combs MD, PGY-1      Attestation:   Note Completion:  I am a:  Resident/Fellow   Attending Attestation I saw and evaluated the patient.  I personally obtained the key  and critical portions of the history and physical exam or was physically present for key and  critical portions performed by the resident/fellow. I reviewed the resident/fellow?s documentation and discussed the patient with the resident/fellow.  I agree with the resident/fellow?s medical decision making as documented in the note.     I personally evaluated the patient on 2023         Electronic Signatures:  Deb Maciel)  (Signed 2023 06:56)   Authored: Note Completion   Co-Signer: Subjective Data, Objective Data,  Testing, Assessment and Plan, Note Completion  Hallie Combs (Resident))  (Signed 2023 02:51)   Authored: Current Stage, Subjective Data, Objective Data,   Testing, Assessment and Plan, Note Completion      Last Updated: 2023 06:56 by Deb Maciel)

## 2023-10-26 ENCOUNTER — APPOINTMENT (OUTPATIENT)
Dept: GENERAL RADIOLOGY | Age: 23
End: 2023-10-26
Payer: COMMERCIAL

## 2023-10-26 ENCOUNTER — HOSPITAL ENCOUNTER (EMERGENCY)
Age: 23
Discharge: HOME | End: 2023-10-26
Attending: STUDENT IN AN ORGANIZED HEALTH CARE EDUCATION/TRAINING PROGRAM
Payer: COMMERCIAL

## 2023-10-26 ENCOUNTER — HOSPITAL ENCOUNTER (EMERGENCY)
Age: 23
Discharge: PSYCHIATRIC OR SUBSTANCE USE FACILITY | End: 2023-10-27
Attending: EMERGENCY MEDICINE
Payer: COMMERCIAL

## 2023-10-26 ENCOUNTER — APPOINTMENT (OUTPATIENT)
Dept: CT IMAGING | Age: 23
End: 2023-10-26
Payer: COMMERCIAL

## 2023-10-26 VITALS
SYSTOLIC BLOOD PRESSURE: 112 MMHG | OXYGEN SATURATION: 97 % | BODY MASS INDEX: 29.95 KG/M2 | RESPIRATION RATE: 16 BRPM | DIASTOLIC BLOOD PRESSURE: 66 MMHG | WEIGHT: 180 LBS | HEART RATE: 64 BPM | TEMPERATURE: 98.4 F

## 2023-10-26 DIAGNOSIS — R45.850 HOMICIDAL IDEATION: ICD-10-CM

## 2023-10-26 DIAGNOSIS — R45.851 SUICIDAL IDEATIONS: Primary | ICD-10-CM

## 2023-10-26 DIAGNOSIS — T74.91XA DOMESTIC VIOLENCE OF ADULT, INITIAL ENCOUNTER: Primary | ICD-10-CM

## 2023-10-26 LAB
ABO + RH BLD: NORMAL
ALBUMIN SERPL-MCNC: 3.8 G/DL (ref 3.5–5.2)
ALBUMIN SERPL-MCNC: 4.2 G/DL (ref 3.5–5.2)
ALP SERPL-CCNC: 82 U/L (ref 35–104)
ALP SERPL-CCNC: 90 U/L (ref 35–104)
ALT SERPL-CCNC: 49 U/L (ref 0–32)
ALT SERPL-CCNC: 53 U/L (ref 0–32)
AMPHET UR QL SCN: NEGATIVE
ANION GAP SERPL CALCULATED.3IONS-SCNC: 10 MMOL/L (ref 7–16)
ANION GAP SERPL CALCULATED.3IONS-SCNC: 11 MMOL/L (ref 7–16)
APAP SERPL-MCNC: <5 UG/ML (ref 10–30)
ARM BAND NUMBER: NORMAL
AST SERPL-CCNC: 32 U/L (ref 0–31)
AST SERPL-CCNC: 43 U/L (ref 0–31)
BARBITURATES UR QL SCN: NEGATIVE
BASOPHILS # BLD: 0.01 K/UL (ref 0–0.2)
BASOPHILS # BLD: 0.02 K/UL (ref 0–0.2)
BASOPHILS NFR BLD: 0 % (ref 0–2)
BASOPHILS NFR BLD: 0 % (ref 0–2)
BENZODIAZ UR QL: NEGATIVE
BILIRUB SERPL-MCNC: 0.2 MG/DL (ref 0–1.2)
BILIRUB SERPL-MCNC: 0.3 MG/DL (ref 0–1.2)
BILIRUB UR QL STRIP: NEGATIVE
BLOOD BANK SAMPLE EXPIRATION: NORMAL
BLOOD GROUP ANTIBODIES SERPL: NEGATIVE
BUN SERPL-MCNC: 9 MG/DL (ref 6–20)
BUN SERPL-MCNC: 9 MG/DL (ref 6–20)
BUPRENORPHINE UR QL: NEGATIVE
CALCIUM SERPL-MCNC: 8.7 MG/DL (ref 8.6–10.2)
CALCIUM SERPL-MCNC: 9 MG/DL (ref 8.6–10.2)
CANNABINOIDS UR QL SCN: NEGATIVE
CHLORIDE SERPL-SCNC: 105 MMOL/L (ref 98–107)
CHLORIDE SERPL-SCNC: 108 MMOL/L (ref 98–107)
CK SERPL-CCNC: 134 U/L (ref 20–180)
CLARITY UR: CLEAR
CO2 SERPL-SCNC: 23 MMOL/L (ref 22–29)
CO2 SERPL-SCNC: 24 MMOL/L (ref 22–29)
COCAINE UR QL SCN: NEGATIVE
COLOR UR: YELLOW
CREAT SERPL-MCNC: 0.6 MG/DL (ref 0.5–1)
CREAT SERPL-MCNC: 0.6 MG/DL (ref 0.5–1)
CRYSTALS URNS MICRO: ABNORMAL /HPF
EOSINOPHIL # BLD: 0.08 K/UL (ref 0.05–0.5)
EOSINOPHIL # BLD: 0.15 K/UL (ref 0.05–0.5)
EOSINOPHILS RELATIVE PERCENT: 1 % (ref 0–6)
EOSINOPHILS RELATIVE PERCENT: 2 % (ref 0–6)
EPI CELLS #/AREA URNS HPF: ABNORMAL /HPF
ERYTHROCYTE [DISTWIDTH] IN BLOOD BY AUTOMATED COUNT: 13.8 % (ref 11.5–15)
ERYTHROCYTE [DISTWIDTH] IN BLOOD BY AUTOMATED COUNT: 14.1 % (ref 11.5–15)
ETHANOLAMINE SERPL-MCNC: <10 MG/DL
FENTANYL UR QL: NEGATIVE
GFR SERPL CREATININE-BSD FRML MDRD: >60 ML/MIN/1.73M2
GFR SERPL CREATININE-BSD FRML MDRD: >60 ML/MIN/1.73M2
GLUCOSE SERPL-MCNC: 113 MG/DL (ref 74–99)
GLUCOSE SERPL-MCNC: 92 MG/DL (ref 74–99)
GLUCOSE UR STRIP-MCNC: NEGATIVE MG/DL
HCG, URINE, POC: NEGATIVE
HCG, URINE, POC: NEGATIVE
HCT VFR BLD AUTO: 33 % (ref 34–48)
HCT VFR BLD AUTO: 34.4 % (ref 34–48)
HGB BLD-MCNC: 10.6 G/DL (ref 11.5–15.5)
HGB BLD-MCNC: 11.3 G/DL (ref 11.5–15.5)
HGB UR QL STRIP.AUTO: ABNORMAL
IMM GRANULOCYTES # BLD AUTO: 0.03 K/UL (ref 0–0.58)
IMM GRANULOCYTES # BLD AUTO: 0.03 K/UL (ref 0–0.58)
IMM GRANULOCYTES NFR BLD: 0 % (ref 0–5)
IMM GRANULOCYTES NFR BLD: 0 % (ref 0–5)
KETONES UR STRIP-MCNC: NEGATIVE MG/DL
LEUKOCYTE ESTERASE UR QL STRIP: NEGATIVE
LYMPHOCYTES NFR BLD: 1.38 K/UL (ref 1.5–4)
LYMPHOCYTES NFR BLD: 1.99 K/UL (ref 1.5–4)
LYMPHOCYTES RELATIVE PERCENT: 18 % (ref 20–42)
LYMPHOCYTES RELATIVE PERCENT: 21 % (ref 20–42)
Lab: NORMAL
Lab: NORMAL
MCH RBC QN AUTO: 24.9 PG (ref 26–35)
MCH RBC QN AUTO: 24.9 PG (ref 26–35)
MCHC RBC AUTO-ENTMCNC: 32.1 G/DL (ref 32–34.5)
MCHC RBC AUTO-ENTMCNC: 32.8 G/DL (ref 32–34.5)
MCV RBC AUTO: 75.9 FL (ref 80–99.9)
MCV RBC AUTO: 77.6 FL (ref 80–99.9)
METHADONE UR QL: NEGATIVE
MONOCYTES NFR BLD: 0.63 K/UL (ref 0.1–0.95)
MONOCYTES NFR BLD: 0.74 K/UL (ref 0.1–0.95)
MONOCYTES NFR BLD: 8 % (ref 2–12)
MONOCYTES NFR BLD: 8 % (ref 2–12)
NEGATIVE QC PASS/FAIL: NORMAL
NEGATIVE QC PASS/FAIL: NORMAL
NEUTROPHILS NFR BLD: 69 % (ref 43–80)
NEUTROPHILS NFR BLD: 73 % (ref 43–80)
NEUTS SEG NFR BLD: 5.68 K/UL (ref 1.8–7.3)
NEUTS SEG NFR BLD: 6.41 K/UL (ref 1.8–7.3)
NITRITE UR QL STRIP: NEGATIVE
OPIATES UR QL SCN: NEGATIVE
OXYCODONE UR QL SCN: NEGATIVE
PCP UR QL SCN: NEGATIVE
PH UR STRIP: 6 [PH] (ref 5–9)
PLATELET # BLD AUTO: 280 K/UL (ref 130–450)
PLATELET # BLD AUTO: 301 K/UL (ref 130–450)
PMV BLD AUTO: 10.1 FL (ref 7–12)
PMV BLD AUTO: 9.7 FL (ref 7–12)
POSITIVE QC PASS/FAIL: NORMAL
POSITIVE QC PASS/FAIL: NORMAL
POTASSIUM SERPL-SCNC: 3.4 MMOL/L (ref 3.5–5)
POTASSIUM SERPL-SCNC: 3.6 MMOL/L (ref 3.5–5)
PROT SERPL-MCNC: 7.2 G/DL (ref 6.4–8.3)
PROT SERPL-MCNC: 7.9 G/DL (ref 6.4–8.3)
PROT UR STRIP-MCNC: 30 MG/DL
RBC # BLD AUTO: 4.25 M/UL (ref 3.5–5.5)
RBC # BLD AUTO: 4.53 M/UL (ref 3.5–5.5)
RBC #/AREA URNS HPF: ABNORMAL /HPF
SALICYLATES SERPL-MCNC: <0.3 MG/DL (ref 0–30)
SARS-COV-2 RDRP RESP QL NAA+PROBE: NOT DETECTED
SODIUM SERPL-SCNC: 140 MMOL/L (ref 132–146)
SODIUM SERPL-SCNC: 141 MMOL/L (ref 132–146)
SP GR UR STRIP: >1.03 (ref 1–1.03)
SPECIMEN DESCRIPTION: NORMAL
TEST INFORMATION: NORMAL
TOXIC TRICYCLIC SC,BLOOD: NEGATIVE
UROBILINOGEN UR STRIP-ACNC: 1 EU/DL (ref 0–1)
WBC #/AREA URNS HPF: ABNORMAL /HPF
WBC OTHER # BLD: 7.8 K/UL (ref 4.5–11.5)
WBC OTHER # BLD: 9.3 K/UL (ref 4.5–11.5)

## 2023-10-26 PROCEDURE — 72125 CT NECK SPINE W/O DYE: CPT

## 2023-10-26 PROCEDURE — 99285 EMERGENCY DEPT VISIT HI MDM: CPT

## 2023-10-26 PROCEDURE — 80053 COMPREHEN METABOLIC PANEL: CPT

## 2023-10-26 PROCEDURE — 86850 RBC ANTIBODY SCREEN: CPT

## 2023-10-26 PROCEDURE — 86901 BLOOD TYPING SEROLOGIC RH(D): CPT

## 2023-10-26 PROCEDURE — 87635 SARS-COV-2 COVID-19 AMP PRB: CPT

## 2023-10-26 PROCEDURE — 86900 BLOOD TYPING SEROLOGIC ABO: CPT

## 2023-10-26 PROCEDURE — 85025 COMPLETE CBC W/AUTO DIFF WBC: CPT

## 2023-10-26 PROCEDURE — 80307 DRUG TEST PRSMV CHEM ANLYZR: CPT

## 2023-10-26 PROCEDURE — 6370000000 HC RX 637 (ALT 250 FOR IP)

## 2023-10-26 PROCEDURE — 80143 DRUG ASSAY ACETAMINOPHEN: CPT

## 2023-10-26 PROCEDURE — 81001 URINALYSIS AUTO W/SCOPE: CPT

## 2023-10-26 PROCEDURE — 80179 DRUG ASSAY SALICYLATE: CPT

## 2023-10-26 PROCEDURE — 93005 ELECTROCARDIOGRAM TRACING: CPT | Performed by: EMERGENCY MEDICINE

## 2023-10-26 PROCEDURE — G0480 DRUG TEST DEF 1-7 CLASSES: HCPCS

## 2023-10-26 PROCEDURE — 2580000003 HC RX 258

## 2023-10-26 PROCEDURE — 71260 CT THORAX DX C+: CPT

## 2023-10-26 PROCEDURE — 82550 ASSAY OF CK (CPK): CPT

## 2023-10-26 PROCEDURE — 6360000004 HC RX CONTRAST MEDICATION: Performed by: RADIOLOGY

## 2023-10-26 PROCEDURE — 71045 X-RAY EXAM CHEST 1 VIEW: CPT

## 2023-10-26 PROCEDURE — 70450 CT HEAD/BRAIN W/O DYE: CPT

## 2023-10-26 RX ORDER — ACETAMINOPHEN 500 MG
1000 TABLET ORAL ONCE
Status: COMPLETED | OUTPATIENT
Start: 2023-10-26 | End: 2023-10-26

## 2023-10-26 RX ORDER — 0.9 % SODIUM CHLORIDE 0.9 %
1000 INTRAVENOUS SOLUTION INTRAVENOUS ONCE
Status: COMPLETED | OUTPATIENT
Start: 2023-10-26 | End: 2023-10-26

## 2023-10-26 RX ADMIN — POTASSIUM BICARBONATE 25 MEQ: 978 TABLET, EFFERVESCENT ORAL at 07:22

## 2023-10-26 RX ADMIN — IOPAMIDOL 80 ML: 755 INJECTION, SOLUTION INTRAVENOUS at 03:11

## 2023-10-26 RX ADMIN — SODIUM CHLORIDE 1000 ML: 9 INJECTION, SOLUTION INTRAVENOUS at 02:18

## 2023-10-26 RX ADMIN — ACETAMINOPHEN 1000 MG: 500 TABLET ORAL at 02:18

## 2023-10-26 ASSESSMENT — LIFESTYLE VARIABLES
HOW OFTEN DO YOU HAVE A DRINK CONTAINING ALCOHOL: NEVER
HOW OFTEN DO YOU HAVE A DRINK CONTAINING ALCOHOL: NEVER
HOW MANY STANDARD DRINKS CONTAINING ALCOHOL DO YOU HAVE ON A TYPICAL DAY: PATIENT DOES NOT DRINK

## 2023-10-26 ASSESSMENT — PATIENT HEALTH QUESTIONNAIRE - PHQ9
SUM OF ALL RESPONSES TO PHQ QUESTIONS 1-9: 2
2. FEELING DOWN, DEPRESSED OR HOPELESS: 1
SUM OF ALL RESPONSES TO PHQ9 QUESTIONS 1 & 2: 2
1. LITTLE INTEREST OR PLEASURE IN DOING THINGS: 1
SUM OF ALL RESPONSES TO PHQ QUESTIONS 1-9: 2

## 2023-10-26 ASSESSMENT — PAIN - FUNCTIONAL ASSESSMENT
PAIN_FUNCTIONAL_ASSESSMENT: NONE - DENIES PAIN
PAIN_FUNCTIONAL_ASSESSMENT: 0-10

## 2023-10-26 ASSESSMENT — PAIN DESCRIPTION - FREQUENCY: FREQUENCY: CONTINUOUS

## 2023-10-26 ASSESSMENT — PAIN DESCRIPTION - PAIN TYPE
TYPE: ACUTE PAIN
TYPE: ACUTE PAIN

## 2023-10-26 ASSESSMENT — PAIN DESCRIPTION - ORIENTATION: ORIENTATION: POSTERIOR

## 2023-10-26 ASSESSMENT — PAIN DESCRIPTION - LOCATION: LOCATION: HEAD;NECK

## 2023-10-26 ASSESSMENT — PAIN SCALES - GENERAL: PAINLEVEL_OUTOF10: 8

## 2023-10-26 NOTE — ED PROVIDER NOTES
NEGATIVE    Ketones, Urine NEGATIVE NEGATIVE mg/dL    Specific Gravity, UA >1.030 (H) 1.005 - 1.030    Urine Hgb LARGE (A) NEGATIVE    pH, UA 6.0 5.0 - 9.0    Protein, UA 30 (A) NEGATIVE mg/dL    Urobilinogen, Urine 1.0 0.0 - 1.0 EU/dL    Nitrite, Urine NEGATIVE NEGATIVE    Leukocyte Esterase, Urine NEGATIVE NEGATIVE   CMP   Result Value Ref Range    Sodium 141 132 - 146 mmol/L    Potassium 3.4 (L) 3.5 - 5.0 mmol/L    Chloride 108 (H) 98 - 107 mmol/L    CO2 23 22 - 29 mmol/L    Anion Gap 10 7 - 16 mmol/L    Glucose 113 (H) 74 - 99 mg/dL    BUN 9 6 - 20 mg/dL    Creatinine 0.6 0.50 - 1.00 mg/dL    Est, Glom Filt Rate >60 >60 mL/min/1.73m2    Calcium 8.7 8.6 - 10.2 mg/dL    Total Protein 7.2 6.4 - 8.3 g/dL    Albumin 3.8 3.5 - 5.2 g/dL    Total Bilirubin 0.3 0.0 - 1.2 mg/dL    Alkaline Phosphatase 82 35 - 104 U/L    ALT 49 (H) 0 - 32 U/L    AST 43 (H) 0 - 31 U/L   CK   Result Value Ref Range    Total  20 - 180 U/L   Microscopic Urinalysis   Result Value Ref Range    WBC, UA 0 TO 5 0 TO 5 /HPF    RBC, UA 10 TO 20 (A) 0 TO 2 /HPF    Crystals, UA FEW CALCIUM OXALATE (A) None /HPF    Epithelial Cells UA 0 TO 2 /HPF   POC Pregnancy Urine Qual   Result Value Ref Range    HCG, Urine, POC Negative Negative    Lot Number [id number]     Positive QC Pass/Fail Acceptable     Negative QC Pass/Fail Acceptable    TYPE AND SCREEN   Result Value Ref Range    Blood Bank Sample Expiration 10/29/2023,2359     Arm Band Number [id number]     ABO/Rh B POSITIVE     Antibody Screen NEGATIVE        Radiology reviewed and interpreted by me:  CT Head W/O Contrast   Final Result   No acute intracranial abnormality. Marked thickening of the maxillary sinuses. CT CSpine W/O Contrast   Final Result   No acute fracture. CT CHEST W CONTRAST   Final Result   No acute abnormality. XR CHEST PORTABLE   Final Result   No acute process.              Medications administered today:  Medications   potassium bicarbonate

## 2023-10-26 NOTE — DISCHARGE INSTRUCTIONS
Please return to the ED if symptoms worsen, persist, recur. Please take all your medications as prescribed. Please follow-up with PCP as discussed. Mental Health Counseling Agencies    Help Hotline: 826, [phone] or [address]  and Recovery Board 617- [phone]   [place] and [address]   [address] [facility] and Recovery Board [phone]   If you are in need of help and experiencing any barriers to care please contact help hotline 24 hours a day and/or your local board of mental health and recovery during normal business hours. [address] [facility] ([address] [facility])  Address: [address] [place] [address] [address]  Phone: [phone]  Medical and Counseling, Behavioral health, Dental, Telehealth, Recovery Services, Nutrition Services, On-site pharmacy  [email or web address]. [facility] [phone] for [facility]. [facility] [address] [facility] [address]  [address]  [phone]   They have walk-up services 11:00am to 1:30 pm Mon thru Friday (For insurances other than Medicare and Private Ins.)  Medicare and Private Ins. Walk-up is ONLY Mon/Wed/Fri  Best to arrive between [facility]  [address]  Phone: [phone]  Ask for Lisa Segura is in intake and schedules all appts. [address] [address]  Phone: [place]  [address]  [facility] [address]  Phone: [phone] [facility] location  [phone], [address] [facility] [address]  Phone: [phone] [facility] location  [address]  [address] [facility] [address]  Phone:
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
## 2023-10-27 VITALS
SYSTOLIC BLOOD PRESSURE: 100 MMHG | OXYGEN SATURATION: 98 % | HEIGHT: 65 IN | RESPIRATION RATE: 16 BRPM | WEIGHT: 190 LBS | DIASTOLIC BLOOD PRESSURE: 60 MMHG | TEMPERATURE: 97.9 F | BODY MASS INDEX: 31.65 KG/M2 | HEART RATE: 66 BPM

## 2023-10-27 LAB
EKG ATRIAL RATE: 75 BPM
EKG P AXIS: 41 DEGREES
EKG P-R INTERVAL: 166 MS
EKG Q-T INTERVAL: 402 MS
EKG QRS DURATION: 86 MS
EKG QTC CALCULATION (BAZETT): 448 MS
EKG R AXIS: 68 DEGREES
EKG T AXIS: 53 DEGREES
EKG VENTRICULAR RATE: 75 BPM

## 2023-10-27 PROCEDURE — 93010 ELECTROCARDIOGRAM REPORT: CPT | Performed by: INTERNAL MEDICINE

## 2023-10-27 ASSESSMENT — PAIN - FUNCTIONAL ASSESSMENT: PAIN_FUNCTIONAL_ASSESSMENT: NONE - DENIES PAIN

## 2023-10-27 NOTE — ED NOTES
Fax to nursing office for ABDIEL Hinkle in staffing notified     Jojo Marques, VICTORIANO  10/26/23 [phone]
PT HAS BEEN ACCEPTED TO [facility] BY DR. Yolanda BREAUX BY 1000    PINK SLIP FAXED -[phone]     N [address] [facility], [address] [facility]  10/27/23 1754
Pt given a breakfast tray     [facility]  10/27/23 0808
Pt in hospital gown and pants. Pt has two patient belonging bags with labels and a blue tote bag with patient sticker on that were placed in locker 29.      Oma Goldberg RN  10/26/23 9857
below)   When? Today    Did you follow through with the thoughts? [x] No     [] Yes- When and what happened? 2.  Have you ever threatened anyone? []  No  [x]  Yes (Ask the questions listed below)   When and what happened? Past, unable to provide details    Have you ever threatened someone with a gun, knife or other weapon? [x]  No  []  Yes - When and what happened? 2. Have you ever had an order of protection taken out against you? []  Yes [x]  No  3. Have you ever been arrested due to violence? [x]  Yes []  No  4. Have you ever been cruel to animals? []  Yes [x]  No    After consideration of C-SSRS screening results, C-SSRS assessments, and this professional's assessment the patient's overall suicide risk assessed to be:  [] No Risk  [] Low   [] Moderate   [x] High     [x] Discussed current suicide risk, protective and risk factors with RN and ED Physician. Consulted with ED Physician. Disposition/level of care recommended at this time:  [] Home:   [] Outpatient Provider:   [] Crisis Unit:   [x] Inpatient Psychiatric Unit:  [] Other:     Pt has been [place]li[place] by ED physician. Once medically cleared, SW will proceed with inpatient admission to ensure Pt safety and stabilization.       ANNY Alvarenga, [place]  10/27/23 9612
18

## 2024-02-18 ENCOUNTER — HOSPITAL ENCOUNTER (EMERGENCY)
Age: 24
Discharge: HOME | End: 2024-02-19
Attending: EMERGENCY MEDICINE
Payer: COMMERCIAL

## 2024-02-18 ENCOUNTER — APPOINTMENT (OUTPATIENT)
Dept: CT IMAGING | Age: 24
End: 2024-02-18
Attending: EMERGENCY MEDICINE
Payer: COMMERCIAL

## 2024-02-18 DIAGNOSIS — I88.0 MESENTERIC ADENITIS: ICD-10-CM

## 2024-02-18 DIAGNOSIS — N30.00 ACUTE CYSTITIS WITHOUT HEMATURIA: Primary | ICD-10-CM

## 2024-02-18 DIAGNOSIS — N20.0 KIDNEY STONE: ICD-10-CM

## 2024-02-18 LAB
ALBUMIN SERPL-MCNC: 4.5 G/DL (ref 3.5–5.2)
ALP SERPL-CCNC: 97 U/L (ref 35–104)
ALT SERPL-CCNC: 15 U/L (ref 0–32)
ANION GAP SERPL CALCULATED.3IONS-SCNC: 11 MMOL/L (ref 7–16)
AST SERPL-CCNC: 18 U/L (ref 0–31)
BACTERIA URNS QL MICRO: ABNORMAL
BASOPHILS # BLD: 0.03 K/UL (ref 0–0.2)
BASOPHILS NFR BLD: 0 % (ref 0–2)
BILIRUB SERPL-MCNC: 0.4 MG/DL (ref 0–1.2)
BILIRUB UR QL STRIP: NEGATIVE
BUN SERPL-MCNC: 14 MG/DL (ref 6–20)
CALCIUM SERPL-MCNC: 9.7 MG/DL (ref 8.6–10.2)
CHLORIDE SERPL-SCNC: 103 MMOL/L (ref 98–107)
CLARITY UR: ABNORMAL
CO2 SERPL-SCNC: 28 MMOL/L (ref 22–29)
COLOR UR: YELLOW
CREAT SERPL-MCNC: 0.6 MG/DL (ref 0.5–1)
EOSINOPHIL # BLD: 0.1 K/UL (ref 0.05–0.5)
EOSINOPHILS RELATIVE PERCENT: 1 % (ref 0–6)
ERYTHROCYTE [DISTWIDTH] IN BLOOD BY AUTOMATED COUNT: 14.8 % (ref 11.5–15)
GFR SERPL CREATININE-BSD FRML MDRD: >60 ML/MIN/1.73M2
GLUCOSE SERPL-MCNC: 88 MG/DL (ref 74–99)
GLUCOSE UR STRIP-MCNC: NEGATIVE MG/DL
HCG, URINE, POC: NEGATIVE
HCT VFR BLD AUTO: 43.1 % (ref 34–48)
HGB BLD-MCNC: 13.6 G/DL (ref 11.5–15.5)
HGB UR QL STRIP.AUTO: NEGATIVE
IMM GRANULOCYTES # BLD AUTO: 0.03 K/UL (ref 0–0.58)
IMM GRANULOCYTES NFR BLD: 0 % (ref 0–5)
KETONES UR STRIP-MCNC: NEGATIVE MG/DL
LACTATE BLDV-SCNC: 0.8 MMOL/L (ref 0.5–2.2)
LEUKOCYTE ESTERASE UR QL STRIP: ABNORMAL
LIPASE SERPL-CCNC: 30 U/L (ref 13–60)
LYMPHOCYTES NFR BLD: 1.48 K/UL (ref 1.5–4)
LYMPHOCYTES RELATIVE PERCENT: 17 % (ref 20–42)
Lab: NORMAL
MCH RBC QN AUTO: 24.1 PG (ref 26–35)
MCHC RBC AUTO-ENTMCNC: 31.6 G/DL (ref 32–34.5)
MCV RBC AUTO: 76.4 FL (ref 80–99.9)
MONOCYTES NFR BLD: 0.5 K/UL (ref 0.1–0.95)
MONOCYTES NFR BLD: 6 % (ref 2–12)
NEGATIVE QC PASS/FAIL: NORMAL
NEUTROPHILS NFR BLD: 76 % (ref 43–80)
NEUTS SEG NFR BLD: 6.6 K/UL (ref 1.8–7.3)
NITRITE UR QL STRIP: POSITIVE
PH UR STRIP: 7 [PH] (ref 5–9)
PLATELET # BLD AUTO: 316 K/UL (ref 130–450)
PMV BLD AUTO: 10.2 FL (ref 7–12)
POSITIVE QC PASS/FAIL: NORMAL
POTASSIUM SERPL-SCNC: 4 MMOL/L (ref 3.5–5)
PROT SERPL-MCNC: 8.2 G/DL (ref 6.4–8.3)
PROT UR STRIP-MCNC: NEGATIVE MG/DL
RBC # BLD AUTO: 5.64 M/UL (ref 3.5–5.5)
RBC #/AREA URNS HPF: ABNORMAL /HPF
SODIUM SERPL-SCNC: 142 MMOL/L (ref 132–146)
SP GR UR STRIP: 1.02 (ref 1–1.03)
UROBILINOGEN UR STRIP-ACNC: 1 EU/DL (ref 0–1)
WBC #/AREA URNS HPF: ABNORMAL /HPF
WBC OTHER # BLD: 8.7 K/UL (ref 4.5–11.5)

## 2024-02-18 PROCEDURE — 99285 EMERGENCY DEPT VISIT HI MDM: CPT

## 2024-02-18 PROCEDURE — 74177 CT ABD & PELVIS W/CONTRAST: CPT

## 2024-02-18 PROCEDURE — 80053 COMPREHEN METABOLIC PANEL: CPT

## 2024-02-18 PROCEDURE — 81001 URINALYSIS AUTO W/SCOPE: CPT

## 2024-02-18 PROCEDURE — 6360000004 HC RX CONTRAST MEDICATION: Performed by: RADIOLOGY

## 2024-02-18 PROCEDURE — 83690 ASSAY OF LIPASE: CPT

## 2024-02-18 PROCEDURE — 83605 ASSAY OF LACTIC ACID: CPT

## 2024-02-18 PROCEDURE — 85025 COMPLETE CBC W/AUTO DIFF WBC: CPT

## 2024-02-18 RX ADMIN — IOPAMIDOL 75 ML: 755 INJECTION, SOLUTION INTRAVENOUS at 21:55

## 2024-02-18 ASSESSMENT — PAIN - FUNCTIONAL ASSESSMENT: PAIN_FUNCTIONAL_ASSESSMENT: NONE - DENIES PAIN

## 2024-02-19 VITALS
RESPIRATION RATE: 18 BRPM | OXYGEN SATURATION: 99 % | SYSTOLIC BLOOD PRESSURE: 115 MMHG | DIASTOLIC BLOOD PRESSURE: 65 MMHG | TEMPERATURE: 98.2 F | HEART RATE: 68 BPM

## 2024-02-19 PROCEDURE — 6370000000 HC RX 637 (ALT 250 FOR IP): Performed by: EMERGENCY MEDICINE

## 2024-02-19 RX ORDER — NITROFURANTOIN 25; 75 MG/1; MG/1
100 CAPSULE ORAL ONCE
Status: COMPLETED | OUTPATIENT
Start: 2024-02-19 | End: 2024-02-19

## 2024-02-19 RX ORDER — NITROFURANTOIN 25; 75 MG/1; MG/1
100 CAPSULE ORAL 2 TIMES DAILY
Qty: 14 CAPSULE | Refills: 0 | Status: SHIPPED | OUTPATIENT
Start: 2024-02-19 | End: 2024-02-22

## 2024-02-19 RX ADMIN — NITROFURANTOIN MONOHYDRATE/MACROCRYSTALS 100 MG: 75; 25 CAPSULE ORAL at 00:20

## 2024-02-19 NOTE — DISCHARGE INSTRUCTIONS
Take antibiotic as prescribed.  Increase your fluid intake.  Return to the ER for new, worsening or changing symptoms.  Establish care with a primary care provider

## 2024-02-19 NOTE — ED PROVIDER NOTES
physician discussed at length with them reasons for immediate return here for re evaluation. They will followup with primary care by calling their office tomorrow.      --------------------------------- ADDITIONAL PROVIDER NOTES ---------------------------------  At this time the patient is without objective evidence of an acute process requiring hospitalization or inpatient management.  They have remained hemodynamically stable throughout their entire ED visit and are stable for discharge with outpatient follow-up.     The plan has been discussed in detail and they are aware of the specific conditions for emergent return, as well as the importance of follow-up.      Discharge Medication List as of 2/19/2024 12:29 AM        START taking these medications    Details   nitrofurantoin, macrocrystal-monohydrate, (MACROBID) 100 MG capsule Take 1 capsule by mouth 2 times daily for 7 days, Disp-14 capsule, R-0Print             Diagnosis:  1. Acute cystitis without hematuria    2. Mesenteric adenitis    3. Kidney stone        Disposition:  Patient's disposition: Discharge to home  Patient's condition is stable.    NOTE: This report was transcribed using voice recognition software. Every effort was made to ensure accuracy; however, inadvertent computerized transcription errors may be present        Lilian Braden DO  02/20/24 1942

## 2024-02-19 NOTE — ED NOTES
Pt in chair #5, medicated as ordered, vital signs rechecked. Er Dr at chair side updating pt  labs, ct results and giving prescription and going over discharge instructions with pt voicing understanding. Iv discontinued dsd applied.

## 2024-02-22 ENCOUNTER — HOSPITAL ENCOUNTER (INPATIENT)
Age: 24
LOS: 5 days | Discharge: HOME | DRG: 753 | End: 2024-02-27
Attending: EMERGENCY MEDICINE | Admitting: PSYCHIATRY & NEUROLOGY
Payer: COMMERCIAL

## 2024-02-22 DIAGNOSIS — F39 MOOD DISORDER (HCC): Primary | ICD-10-CM

## 2024-02-22 DIAGNOSIS — N39.0 UTI (URINARY TRACT INFECTION), UNCOMPLICATED: ICD-10-CM

## 2024-02-22 PROBLEM — F33.1 MDD (MAJOR DEPRESSIVE DISORDER), RECURRENT EPISODE, MODERATE (HCC): Status: ACTIVE | Noted: 2024-02-22

## 2024-02-22 LAB
ALBUMIN SERPL-MCNC: 4.4 G/DL (ref 3.5–5.2)
ALP SERPL-CCNC: 81 U/L (ref 35–104)
ALT SERPL-CCNC: 15 U/L (ref 0–32)
AMPHET UR QL SCN: NEGATIVE
ANION GAP SERPL CALCULATED.3IONS-SCNC: 13 MMOL/L (ref 7–16)
APAP SERPL-MCNC: <5 UG/ML (ref 10–30)
AST SERPL-CCNC: 16 U/L (ref 0–31)
BACTERIA URNS QL MICRO: ABNORMAL
BARBITURATES UR QL SCN: NEGATIVE
BASOPHILS # BLD: 0.03 K/UL (ref 0–0.2)
BASOPHILS NFR BLD: 0 % (ref 0–2)
BENZODIAZ UR QL: NEGATIVE
BILIRUB SERPL-MCNC: 0.6 MG/DL (ref 0–1.2)
BILIRUB UR QL STRIP: NEGATIVE
BUN SERPL-MCNC: 15 MG/DL (ref 6–20)
BUPRENORPHINE UR QL: NEGATIVE
CALCIUM SERPL-MCNC: 8.9 MG/DL (ref 8.6–10.2)
CANNABINOIDS UR QL SCN: NEGATIVE
CHLORIDE SERPL-SCNC: 102 MMOL/L (ref 98–107)
CK SERPL-CCNC: 207 U/L (ref 20–180)
CLARITY UR: ABNORMAL
CO2 SERPL-SCNC: 24 MMOL/L (ref 22–29)
COCAINE UR QL SCN: POSITIVE
COLOR UR: YELLOW
CREAT SERPL-MCNC: 0.6 MG/DL (ref 0.5–1)
EOSINOPHIL # BLD: 0.12 K/UL (ref 0.05–0.5)
EOSINOPHILS RELATIVE PERCENT: 1 % (ref 0–6)
EPI CELLS #/AREA URNS HPF: ABNORMAL /HPF
ERYTHROCYTE [DISTWIDTH] IN BLOOD BY AUTOMATED COUNT: 14.6 % (ref 11.5–15)
ETHANOLAMINE SERPL-MCNC: <10 MG/DL
FENTANYL UR QL: NEGATIVE
GFR SERPL CREATININE-BSD FRML MDRD: >60 ML/MIN/1.73M2
GLUCOSE SERPL-MCNC: 102 MG/DL (ref 74–99)
GLUCOSE UR STRIP-MCNC: NEGATIVE MG/DL
HCG, URINE, POC: NEGATIVE
HCT VFR BLD AUTO: 36.4 % (ref 34–48)
HGB BLD-MCNC: 11.9 G/DL (ref 11.5–15.5)
HGB UR QL STRIP.AUTO: NEGATIVE
IMM GRANULOCYTES # BLD AUTO: 0.03 K/UL (ref 0–0.58)
IMM GRANULOCYTES NFR BLD: 0 % (ref 0–5)
KETONES UR STRIP-MCNC: ABNORMAL MG/DL
LEUKOCYTE ESTERASE UR QL STRIP: ABNORMAL
LYMPHOCYTES NFR BLD: 2.64 K/UL (ref 1.5–4)
LYMPHOCYTES RELATIVE PERCENT: 30 % (ref 20–42)
Lab: NORMAL
MCH RBC QN AUTO: 24.3 PG (ref 26–35)
MCHC RBC AUTO-ENTMCNC: 32.7 G/DL (ref 32–34.5)
MCV RBC AUTO: 74.4 FL (ref 80–99.9)
METHADONE UR QL: NEGATIVE
MONOCYTES NFR BLD: 0.9 K/UL (ref 0.1–0.95)
MONOCYTES NFR BLD: 10 % (ref 2–12)
MUCOUS THREADS URNS QL MICRO: PRESENT
NEGATIVE QC PASS/FAIL: NORMAL
NEUTROPHILS NFR BLD: 58 % (ref 43–80)
NEUTS SEG NFR BLD: 5.04 K/UL (ref 1.8–7.3)
NITRITE UR QL STRIP: NEGATIVE
OPIATES UR QL SCN: NEGATIVE
OXYCODONE UR QL SCN: NEGATIVE
PCP UR QL SCN: NEGATIVE
PH UR STRIP: 6 [PH] (ref 5–9)
PLATELET # BLD AUTO: 320 K/UL (ref 130–450)
PMV BLD AUTO: 10.3 FL (ref 7–12)
POSITIVE QC PASS/FAIL: NORMAL
POTASSIUM SERPL-SCNC: 3.6 MMOL/L (ref 3.5–5)
PROT SERPL-MCNC: 7.5 G/DL (ref 6.4–8.3)
PROT UR STRIP-MCNC: NEGATIVE MG/DL
RBC # BLD AUTO: 4.89 M/UL (ref 3.5–5.5)
RBC #/AREA URNS HPF: ABNORMAL /HPF
SALICYLATES SERPL-MCNC: <0.3 MG/DL (ref 0–30)
SODIUM SERPL-SCNC: 139 MMOL/L (ref 132–146)
SP GR UR STRIP: >1.03 (ref 1–1.03)
TEST INFORMATION: ABNORMAL
TOXIC TRICYCLIC SC,BLOOD: NEGATIVE
UROBILINOGEN UR STRIP-ACNC: 0.2 EU/DL (ref 0–1)
WBC #/AREA URNS HPF: ABNORMAL /HPF
WBC OTHER # BLD: 8.8 K/UL (ref 4.5–11.5)

## 2024-02-22 PROCEDURE — 80143 DRUG ASSAY ACETAMINOPHEN: CPT

## 2024-02-22 PROCEDURE — 80307 DRUG TEST PRSMV CHEM ANLYZR: CPT

## 2024-02-22 PROCEDURE — 93005 ELECTROCARDIOGRAM TRACING: CPT | Performed by: EMERGENCY MEDICINE

## 2024-02-22 PROCEDURE — 6370000000 HC RX 637 (ALT 250 FOR IP): Performed by: EMERGENCY MEDICINE

## 2024-02-22 PROCEDURE — 80179 DRUG ASSAY SALICYLATE: CPT

## 2024-02-22 PROCEDURE — 85025 COMPLETE CBC W/AUTO DIFF WBC: CPT

## 2024-02-22 PROCEDURE — 80053 COMPREHEN METABOLIC PANEL: CPT

## 2024-02-22 PROCEDURE — G0480 DRUG TEST DEF 1-7 CLASSES: HCPCS

## 2024-02-22 PROCEDURE — 6370000000 HC RX 637 (ALT 250 FOR IP): Performed by: PSYCHIATRY & NEUROLOGY

## 2024-02-22 PROCEDURE — 1240000000 HC EMOTIONAL WELLNESS R&B

## 2024-02-22 PROCEDURE — 82550 ASSAY OF CK (CPK): CPT

## 2024-02-22 PROCEDURE — 81001 URINALYSIS AUTO W/SCOPE: CPT

## 2024-02-22 PROCEDURE — 99285 EMERGENCY DEPT VISIT HI MDM: CPT

## 2024-02-22 RX ORDER — NICOTINE 21 MG/24HR
1 PATCH, TRANSDERMAL 24 HOURS TRANSDERMAL DAILY
Status: DISCONTINUED | OUTPATIENT
Start: 2024-02-22 | End: 2024-02-27 | Stop reason: HOSPADM

## 2024-02-22 RX ORDER — ACETAMINOPHEN 325 MG/1
650 TABLET ORAL EVERY 6 HOURS PRN
Status: DISCONTINUED | OUTPATIENT
Start: 2024-02-22 | End: 2024-02-27 | Stop reason: HOSPADM

## 2024-02-22 RX ORDER — MAGNESIUM HYDROXIDE/ALUMINUM HYDROXICE/SIMETHICONE 120; 1200; 1200 MG/30ML; MG/30ML; MG/30ML
30 SUSPENSION ORAL PRN
Status: DISCONTINUED | OUTPATIENT
Start: 2024-02-22 | End: 2024-02-27 | Stop reason: HOSPADM

## 2024-02-22 RX ORDER — HYDROXYZINE PAMOATE 25 MG/1
50 CAPSULE ORAL 3 TIMES DAILY PRN
Status: DISCONTINUED | OUTPATIENT
Start: 2024-02-22 | End: 2024-02-27 | Stop reason: HOSPADM

## 2024-02-22 RX ORDER — CEFDINIR 300 MG/1
300 CAPSULE ORAL EVERY 12 HOURS SCHEDULED
Status: DISCONTINUED | OUTPATIENT
Start: 2024-02-22 | End: 2024-02-27 | Stop reason: HOSPADM

## 2024-02-22 RX ORDER — HALOPERIDOL 5 MG/1
5 TABLET ORAL EVERY 6 HOURS PRN
Status: DISCONTINUED | OUTPATIENT
Start: 2024-02-22 | End: 2024-02-27 | Stop reason: HOSPADM

## 2024-02-22 RX ORDER — HALOPERIDOL 5 MG/ML
5 INJECTION INTRAMUSCULAR EVERY 6 HOURS PRN
Status: DISCONTINUED | OUTPATIENT
Start: 2024-02-22 | End: 2024-02-27 | Stop reason: HOSPADM

## 2024-02-22 RX ORDER — LANOLIN ALCOHOL/MO/W.PET/CERES
3 CREAM (GRAM) TOPICAL NIGHTLY PRN
Status: DISCONTINUED | OUTPATIENT
Start: 2024-02-22 | End: 2024-02-27 | Stop reason: HOSPADM

## 2024-02-22 RX ADMIN — HYDROXYZINE PAMOATE 50 MG: 25 CAPSULE ORAL at 20:58

## 2024-02-22 RX ADMIN — MELATONIN 3 MG ORAL TABLET 3 MG: 3 TABLET ORAL at 20:59

## 2024-02-22 RX ADMIN — CEFDINIR 300 MG: 300 CAPSULE ORAL at 20:59

## 2024-02-22 RX ADMIN — CEFDINIR 300 MG: 300 CAPSULE ORAL at 11:02

## 2024-02-22 ASSESSMENT — SLEEP AND FATIGUE QUESTIONNAIRES
DO YOU USE A SLEEP AID: NO
DO YOU HAVE DIFFICULTY SLEEPING: YES
AVERAGE NUMBER OF SLEEP HOURS: 4
SLEEP PATTERN: INSOMNIA

## 2024-02-22 ASSESSMENT — PATIENT HEALTH QUESTIONNAIRE - PHQ9
SUM OF ALL RESPONSES TO PHQ QUESTIONS 1-9: 3
SUM OF ALL RESPONSES TO PHQ QUESTIONS 1-9: 3
1. LITTLE INTEREST OR PLEASURE IN DOING THINGS: 2
SUM OF ALL RESPONSES TO PHQ QUESTIONS 1-9: 3
SUM OF ALL RESPONSES TO PHQ QUESTIONS 1-9: 3
2. FEELING DOWN, DEPRESSED OR HOPELESS: 1
SUM OF ALL RESPONSES TO PHQ9 QUESTIONS 1 & 2: 3

## 2024-02-22 ASSESSMENT — PAIN - FUNCTIONAL ASSESSMENT: PAIN_FUNCTIONAL_ASSESSMENT: NONE - DENIES PAIN

## 2024-02-22 NOTE — ED NOTES
Call placed to [facility]/ Dr. PARIS Romero to present patient for admission.  Awaiting a return phone call.

## 2024-02-22 NOTE — ED PROVIDER NOTES
Patient does admit to using crack cocaine today.  CC/HPI Summary, DDx, ED Course, Reassessment, Tests Considered, Patient expectation:          Fransisca Galindo is a 23 y.o. female history of anxiety and depression presenting to the ED for psychiatric evaluation, beginning several day ago.  The complaint has been persistent, moderate in severity, and worsened by nothing.  Patient reporting feeling depressed and having suicidal thoughts.  Patient reports that she did wish she could overdose.  Patient reporting no thoughts of harming others she reports no hallucinations reports no fever no chills she reports no cough or chest pain she reports no abdominal pain.  Patient reporting no headache she reports no weakness.  Patient does admit to using crack cocaine today.  Patient is awake alert orient x 3 heart exam normal lungs are clear abdomen soft nontender.  Patient neurologically intact.  Patient has no edema.  Patient affect flat depressed suicidal not homicidal patient reporting no hallucinations.  Patient differential includes mood disorder as well as electrolyte disturbance.  Patient lab work white count was 8.8 hemoglobin is 11.9 platelet count was 320 sodium is 139 potassium is 3.6 BUN is 15 creatinine 0.6 alcohol level less than 10 patient's drug screen is positive for cocaine urine was nitrite negative trace leukocyte Estrace +20 1-50 WBCs 2+ bacteria pregnancy test was negative EKG sinus nonspecific.  Patient was ordered Omnicef here in the emergency department.  Patient plan will be for  to evaluate.  Per my standpoint patient is medically clear.      Social Determinants affecting Dx or Tx: Patient does report cocaine use.    Chronic Conditions: History of anxiety and depression    Records Reviewed:   Patient was last seen on February 18, 2024 for cystitis      Re-Evaluations:             Re-evaluation.  Patient’s symptoms show no change      Consultations:                 Critical

## 2024-02-22 NOTE — ED NOTES
Nurse to nurse report given to Shawna PASTRANA on [address] which includes patient presentation complaint, pink slip, medications, lab results EKG Qtc, and past medical/ psych history and admitting orders.
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
## 2024-02-22 NOTE — ED NOTES
Patient is a resident of [facility].    Last known address as of 2/11/2024:    [address] [address]    Electronically signed by ANNY Wood, BOBBYW on 2/22/2024 at 11:40 AM

## 2024-02-22 NOTE — GROUP NOTE
Group Therapy Note    Date: 2/22/2024    Group Start Time: 1815  Group End Time: 1830  Group Topic: Wrap-Up    [facility] 7[facility] ACUTE BH 2    Jody Kulkarni CTRS    Group Therapy Note    Attendees: 8    Date: 2/22/2024  Start Time: 1815  End Time:  1830  Number of Participants: 8    Type of Group: Wrap-Up    Patients shared progress of goals for the day, highs/lows of the day, reflected on what was learned in groups and how day can be improved for tomorrow. Addressed any milieu issues and concerns. Patient reported her day as \"OK.\" Patient denied any issues or concerns.    Status After Intervention:  Improved    Participation Level: Active Listener and Interactive    Participation Quality: Appropriate      Speech:  quite      Thought Process/Content: Logical  Linear      Affective Functioning: Blunted      Mood:  Flat      Level of consciousness:  Alert and Attentive      Response to Learning: Able to verbalize current knowledge/experience, Able to verbalize/acknowledge new learning, Able to retain information, Capable of insight, Able to change behavior, and Progressing to goal      Endings: None Reported    Modes of Intervention: Education, Support, Socialization, Exploration, Clarifying, and Problem-solving      Discipline Responsible: Psychoeducational Specialist      Signature:  SOPHIA Dillon

## 2024-02-23 PROBLEM — F31.81 BIPOLAR 2 DISORDER, MAJOR DEPRESSIVE EPISODE (HCC): Status: ACTIVE | Noted: 2024-02-23

## 2024-02-23 LAB
EKG ATRIAL RATE: 65 BPM
EKG P AXIS: 49 DEGREES
EKG P-R INTERVAL: 176 MS
EKG Q-T INTERVAL: 458 MS
EKG QRS DURATION: 96 MS
EKG QTC CALCULATION (BAZETT): 476 MS
EKG R AXIS: 69 DEGREES
EKG T AXIS: 33 DEGREES
EKG VENTRICULAR RATE: 65 BPM

## 2024-02-23 PROCEDURE — 6370000000 HC RX 637 (ALT 250 FOR IP): Performed by: NURSE PRACTITIONER

## 2024-02-23 PROCEDURE — 90792 PSYCH DIAG EVAL W/MED SRVCS: CPT | Performed by: NURSE PRACTITIONER

## 2024-02-23 PROCEDURE — 6370000000 HC RX 637 (ALT 250 FOR IP): Performed by: EMERGENCY MEDICINE

## 2024-02-23 PROCEDURE — 1240000000 HC EMOTIONAL WELLNESS R&B

## 2024-02-23 PROCEDURE — 6370000000 HC RX 637 (ALT 250 FOR IP): Performed by: PSYCHIATRY & NEUROLOGY

## 2024-02-23 PROCEDURE — 93010 ELECTROCARDIOGRAM REPORT: CPT | Performed by: INTERNAL MEDICINE

## 2024-02-23 RX ORDER — ARIPIPRAZOLE 5 MG/1
5 TABLET ORAL DAILY
Status: COMPLETED | OUTPATIENT
Start: 2024-02-23 | End: 2024-02-24

## 2024-02-23 RX ORDER — ARIPIPRAZOLE 10 MG/1
10 TABLET ORAL DAILY
Status: DISCONTINUED | OUTPATIENT
Start: 2024-02-25 | End: 2024-02-27 | Stop reason: HOSPADM

## 2024-02-23 RX ORDER — OXCARBAZEPINE 150 MG/1
150 TABLET, FILM COATED ORAL 2 TIMES DAILY
Status: COMPLETED | OUTPATIENT
Start: 2024-02-23 | End: 2024-02-23

## 2024-02-23 RX ADMIN — CEFDINIR 300 MG: 300 CAPSULE ORAL at 21:07

## 2024-02-23 RX ADMIN — OXCARBAZEPINE 150 MG: 150 TABLET, FILM COATED ORAL at 12:27

## 2024-02-23 RX ADMIN — OXCARBAZEPINE 150 MG: 150 TABLET, FILM COATED ORAL at 21:08

## 2024-02-23 RX ADMIN — MELATONIN 3 MG ORAL TABLET 3 MG: 3 TABLET ORAL at 21:08

## 2024-02-23 RX ADMIN — HYDROXYZINE PAMOATE 50 MG: 25 CAPSULE ORAL at 21:08

## 2024-02-23 RX ADMIN — CEFDINIR 300 MG: 300 CAPSULE ORAL at 08:58

## 2024-02-23 RX ADMIN — ARIPIPRAZOLE 5 MG: 5 TABLET ORAL at 12:27

## 2024-02-23 ASSESSMENT — PATIENT HEALTH QUESTIONNAIRE - PHQ9
2. FEELING DOWN, DEPRESSED OR HOPELESS: 2
4. FEELING TIRED OR HAVING LITTLE ENERGY: 3
SUM OF ALL RESPONSES TO PHQ9 QUESTIONS 1 & 2: 3
6. FEELING BAD ABOUT YOURSELF - OR THAT YOU ARE A FAILURE OR HAVE LET YOURSELF OR YOUR FAMILY DOWN: 2
10. IF YOU CHECKED OFF ANY PROBLEMS, HOW DIFFICULT HAVE THESE PROBLEMS MADE IT FOR YOU TO DO YOUR WORK, TAKE CARE OF THINGS AT HOME, OR GET ALONG WITH OTHER PEOPLE: 3
7. TROUBLE CONCENTRATING ON THINGS, SUCH AS READING THE NEWSPAPER OR WATCHING TELEVISION: 2
SUM OF ALL RESPONSES TO PHQ QUESTIONS 1-9: 16
5. POOR APPETITE OR OVEREATING: 0
8. MOVING OR SPEAKING SO SLOWLY THAT OTHER PEOPLE COULD HAVE NOTICED. OR THE OPPOSITE, BEING SO FIGETY OR RESTLESS THAT YOU HAVE BEEN MOVING AROUND A LOT MORE THAN USUAL: 0
SUM OF ALL RESPONSES TO PHQ QUESTIONS 1-9: 16
9. THOUGHTS THAT YOU WOULD BE BETTER OFF DEAD, OR OF HURTING YOURSELF: 3
1. LITTLE INTEREST OR PLEASURE IN DOING THINGS: 1
SUM OF ALL RESPONSES TO PHQ QUESTIONS 1-9: 13
SUM OF ALL RESPONSES TO PHQ QUESTIONS 1-9: 16
3. TROUBLE FALLING OR STAYING ASLEEP: 3

## 2024-02-23 ASSESSMENT — SLEEP AND FATIGUE QUESTIONNAIRES
DO YOU USE A SLEEP AID: YES
SLEEP PATTERN: DIFFICULTY FALLING ASLEEP;RESTLESSNESS;DISTURBED/INTERRUPTED SLEEP
DO YOU HAVE DIFFICULTY SLEEPING: YES

## 2024-02-23 ASSESSMENT — PAIN SCALES - GENERAL
PAINLEVEL_OUTOF10: 0
PAINLEVEL_OUTOF10: 0

## 2024-02-23 NOTE — GROUP NOTE
Group Therapy Note    Date: 2/23/2024    Group Start Time: 1515  Group End Time: 1545  Group Topic: Psychoeducation    [facility] ACUTE BH 2    Jody Kulkarni CTRS    Group Therapy Note    Attendees: 9    Date: 2/23/2024  Start Time: 1515  End Time:  1545  Number of Participants: 9    Type of Group: Psychoeducation    Name:  Sleep Hygiene    Patient's Goal:  ID how sleep affects mental health, ID positive habits to utilize to make sleep better.    Notes:  CTRS lead educational discussion on sleep hygiene. Encouraged patients to share their experiences. Patient was actively engaged in group.    Status After Intervention:  Improved    Participation Level: Active Listener and Interactive    Participation Quality: Appropriate, Attentive, Sharing, and Supportive      Speech:  normal      Thought Process/Content: Logical  Linear      Affective Functioning: Congruent      Mood:  Appropriate      Level of consciousness:  Alert and Attentive      Response to Learning: Able to verbalize current knowledge/experience, Able to verbalize/acknowledge new learning, Able to retain information, Capable of insight, Able to change behavior, and Progressing to goal      Endings: None Reported    Modes of Intervention: Education, Support, Socialization, Exploration, Clarifying, and Problem-solving      Discipline Responsible: Psychoeducational Specialist      Signature:  SOPHIA Dillon

## 2024-02-23 NOTE — CARE COORDINATION
Peer Recovery Support Note    Name: Fransisca Galindo  Date: 2/23/2024    Chief Complaint   Patient presents with    Psychiatric Evaluation     [facility] and a plan to kill herself from smoking crack and hoping it had fentanyl with it        Peer Support met with patient.  [x] Support and education provided  [x] Resources provided   [] Treatment referral:   [] Other:   [] Patient declined peer recovery services     Referred By: [facility] (SW)    Notes: Patient was sad and quiet during our interaction. She did express that she was willing to go to treatment. Possibly a referral to [facility], [facility] or [facility] can be made. Will follow up with JT.    Signed: Lilibeth Hines, 2/23/2024

## 2024-02-23 NOTE — H&P
Department of Psychiatry  History and Physical - Adult     I personally seen and assessed the patient today along with Dr. Romero I agree with the below assessment evaluation recommendations by the medical student.  Mental status examination reveals a 23-year-old female average hygiene average grooming.cooperative and  for assessment.  Psychomotor reveals no abnormality.  Speech is clear she is able to answer questions with relevance and there is no latency of response.  Eye contact is good..  Mood is oppressed.  Blunt, depressed congruent with stated mood thought process linear goal-directed no looseness of association no flight of ideas.  Thought content devoid of auditory or visual hallucinations there is no overt or covert sign of psychosis or paranoia.  Is endorsing suicidal ideations.  No homicidal ideations intent or plan.  Memory intact.   Insight judgment and impulse control limited.  Alert and oriented to person place time situation.        CHIEF COMPLAINT:  \"I'm just done with everything\"    Patient was seen after discussing with the treatment team and reviewing the chart    CIRCUMSTANCES OF ADMISSION:   Fransisca Galindo is a 23 year old female with history of bipolar disorder, drug abuse, homelessness, and past inpatient psychiatric hospitalization at the [facility],  presented to [facility] ED after several days of suicidal ideations with a plan to overdose or jump in front of a train. She was pink slipped in the emergency department.Triggering events leading to hospitalization are drug use relationship problem, homelessness and recently lost custody of her child.     HISTORY OF PRESENT ILLNESS:      Patient is a 23 y.o. unmarried, mother of 1, unemployed female with a past psychiatric history of PTSD, MDD, SUBHA, and post partum depression who has been admitted to both [facility] psychiatric unit and [facility] in the past with the most recent admission to [facility] on 1/22/24, and past suicide attempt

## 2024-02-23 NOTE — GROUP NOTE
Group Therapy Note    Date: 2/23/2024    Group Start Time: 1100  Group End Time: 1130  Group Topic: Recreational    [facility] ACUTE BH 2    Jody Kulkarni CTRS    Group Therapy Note    Attendees: 8    Date: 2/23/2024  Start Time: 1100  End Time:  1130  Number of Participants: 8    Type of Group: Recreational    Name:  This Day in History and Leap Year Trivia    Patient's Goal:  Increased cognitive skills through reminiscing and trivia. Oriented patients to reality.    Notes:  CTRS lead group reminiscing discussion on this day in history facts and lead [employer] trivia game. Encouraged patients to share experiences. Patient was actively engaged in group activity.    Status After Intervention:  Improved    Participation Level: Active Listener and Interactive    Participation Quality: Appropriate, Attentive, Sharing, and Supportive      Speech:  normal      Thought Process/Content: Logical  Linear      Affective Functioning: Congruent      Mood:  Appropriate      Level of consciousness:  Alert and Attentive      Response to Learning: Able to verbalize current knowledge/experience, Able to verbalize/acknowledge new learning, Able to retain information, Capable of insight, and Progressing to goal      Endings: None Reported    Modes of Intervention: Education, Support, Socialization, Exploration, Clarifying, Problem-solving, and Activity      Discipline Responsible: Psychoeducational Specialist      Signature:  SOPHIA Dillon
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
## 2024-02-24 LAB
CHOLEST SERPL-MCNC: 85 MG/DL
HBA1C MFR BLD: 4.6 % (ref 4–5.6)
HDLC SERPL-MCNC: 38 MG/DL
LDLC SERPL CALC-MCNC: 25 MG/DL
TRIGL SERPL-MCNC: 110 MG/DL
VLDLC SERPL CALC-MCNC: 22 MG/DL

## 2024-02-24 PROCEDURE — 6370000000 HC RX 637 (ALT 250 FOR IP): Performed by: PSYCHIATRY & NEUROLOGY

## 2024-02-24 PROCEDURE — 36415 COLL VENOUS BLD VENIPUNCTURE: CPT

## 2024-02-24 PROCEDURE — 83036 HEMOGLOBIN GLYCOSYLATED A1C: CPT

## 2024-02-24 PROCEDURE — 6370000000 HC RX 637 (ALT 250 FOR IP): Performed by: EMERGENCY MEDICINE

## 2024-02-24 PROCEDURE — 99232 SBSQ HOSP IP/OBS MODERATE 35: CPT | Performed by: NURSE PRACTITIONER

## 2024-02-24 PROCEDURE — 1240000000 HC EMOTIONAL WELLNESS R&B

## 2024-02-24 PROCEDURE — 6370000000 HC RX 637 (ALT 250 FOR IP): Performed by: NURSE PRACTITIONER

## 2024-02-24 PROCEDURE — 80061 LIPID PANEL: CPT

## 2024-02-24 RX ADMIN — CEFDINIR 300 MG: 300 CAPSULE ORAL at 20:58

## 2024-02-24 RX ADMIN — ARIPIPRAZOLE 5 MG: 5 TABLET ORAL at 08:49

## 2024-02-24 RX ADMIN — CEFDINIR 300 MG: 300 CAPSULE ORAL at 08:49

## 2024-02-24 RX ADMIN — MELATONIN 3 MG ORAL TABLET 3 MG: 3 TABLET ORAL at 20:58

## 2024-02-24 ASSESSMENT — PAIN SCALES - GENERAL: PAINLEVEL_OUTOF10: 0

## 2024-02-24 NOTE — GROUP NOTE
Group Therapy Note    Date: 2/24/2024    Group Start Time: 1330  Group End Time: 1415  Group Topic: Psychoeducation    [facility]    Paola Parr, CTRS    Date: 2/24/2024  Name:  dimensions of wellness: finding balance    Patient's Goal:will be able to id 8 dimensions of wellness.     Notes:  patient will be able to identify what is going well and what could use some work.    Status After Intervention:  Improved    Participation Level: Active Listener and Interactive    Participation Quality: Appropriate, Attentive, and Sharing      Speech:  normal      Thought Process/Content: Logical      Affective Functioning: Congruent      Mood: euthymic      Level of consciousness:  Alert, Oriented x4, and Attentive      Response to Learning: Able to verbalize/acknowledge new learning, Able to retain information, and Progressing to goal      Endings: None Reported    Modes of Intervention: Education, Support, Socialization, and Exploration      Discipline Responsible: Psychoeducational Specialist      Signature:  Paola Parr, CTRS

## 2024-02-24 NOTE — GROUP NOTE
Group Therapy Note    Date: 2/24/2024    Group Start Time: 1315  Group End Time: 1330  Group Topic: Community Meeting    [facility]    Paola Parr, BETYS    Date: 2/24/2024  Module Name:  community meeting     Patient's Goal:  patient will be able to id daily staffing assignments, visitation rules, phone rules, etc.   Pt will be able to identify goal for the day.  Notes:  pleasant and engaged, able to share when prompted.Willing to set goal for the day.     Status After Intervention:  Improved    Participation Level: Active Listener and Interactive    Participation Quality: Appropriate, Attentive, and Sharing      Speech:  normal      Thought Process/Content: Logical      Affective Functioning: Congruent      Mood: euthymic      Level of consciousness:  Alert, Oriented x4, and Attentive      Response to Learning: Able to verbalize/acknowledge new learning, Able to retain information, and Progressing to goal      Endings: None Reported    Modes of Intervention: Education, Support, Socialization, and Exploration      Discipline Responsible: Psychoeducational Specialist      Signature:  SOPHIA Corral

## 2024-02-25 LAB — B-HCG SERPL EIA 3RD IS-ACNC: <0.1 MIU/ML (ref 0–7)

## 2024-02-25 PROCEDURE — 6370000000 HC RX 637 (ALT 250 FOR IP): Performed by: PSYCHIATRY & NEUROLOGY

## 2024-02-25 PROCEDURE — 99232 SBSQ HOSP IP/OBS MODERATE 35: CPT | Performed by: NURSE PRACTITIONER

## 2024-02-25 PROCEDURE — 6370000000 HC RX 637 (ALT 250 FOR IP): Performed by: EMERGENCY MEDICINE

## 2024-02-25 PROCEDURE — 1240000000 HC EMOTIONAL WELLNESS R&B

## 2024-02-25 PROCEDURE — 84702 CHORIONIC GONADOTROPIN TEST: CPT

## 2024-02-25 RX ADMIN — MAGNESIUM HYDROXIDE 30 ML: 400 SUSPENSION ORAL at 21:22

## 2024-02-25 RX ADMIN — CEFDINIR 300 MG: 300 CAPSULE ORAL at 21:22

## 2024-02-25 RX ADMIN — HYDROXYZINE PAMOATE 50 MG: 25 CAPSULE ORAL at 21:22

## 2024-02-25 RX ADMIN — MELATONIN 3 MG ORAL TABLET 3 MG: 3 TABLET ORAL at 21:22

## 2024-02-25 ASSESSMENT — PAIN DESCRIPTION - ORIENTATION: ORIENTATION: MID;LOWER

## 2024-02-25 ASSESSMENT — PAIN DESCRIPTION - DESCRIPTORS: DESCRIPTORS: ACHING;JABBING

## 2024-02-25 ASSESSMENT — PAIN SCALES - GENERAL
PAINLEVEL_OUTOF10: 0
PAINLEVEL_OUTOF10: 7

## 2024-02-25 ASSESSMENT — PAIN DESCRIPTION - LOCATION: LOCATION: ABDOMEN

## 2024-02-25 ASSESSMENT — PAIN DESCRIPTION - ONSET: ONSET: GRADUAL

## 2024-02-25 ASSESSMENT — PAIN - FUNCTIONAL ASSESSMENT: PAIN_FUNCTIONAL_ASSESSMENT: ACTIVITIES ARE NOT PREVENTED

## 2024-02-25 ASSESSMENT — PAIN DESCRIPTION - FREQUENCY: FREQUENCY: INTERMITTENT

## 2024-02-25 ASSESSMENT — PAIN DESCRIPTION - PAIN TYPE: TYPE: ACUTE PAIN

## 2024-02-25 NOTE — GROUP NOTE
Date: 2/25/2024  Start Time: 1050  End Time:  1130  Number of Participants: 10    Type of Group: Psychoeducation    Wellness Binder Information  Module Name:  Taking time for yourself    Patient's Goal:  Patient will be able to ID one or two ways to improve self-care and taking time for one's self.  Patient will explore the different roles each of us juggle daily and how to incorporate time for one's self in their daily routine.     Notes:  CTRS discussed taking time for yourself along with the responsibilities each of us face daily.  CTRS discussed ways to incorporate time for one's self into one's already established routine and some other tips for taking care of one's self.  Patient was an active participant in discussion and was able to ID one way to improve self care.  Patient was accepting of handout, and receptive to the information received.     Status After Intervention: Improved    Participation Level: Active Listener and Interactive    Participation Quality: Appropriate, Attentive, and Sharing      Speech:  normal      Thought Process/Content: Logical      Affective Functioning: Congruent      Mood: euthymic      Level of consciousness:  Alert and Attentive      Response to Learning: Able to verbalize current knowledge/experience, Able to verbalize/acknowledge new learning, and Progressing to goal      Endings: None Reported    Modes of Intervention: Education, Exploration, and Problem-solving      Discipline Responsible: Recreational Therapist      Signature:  SOPHIA Upton

## 2024-02-25 NOTE — CARE COORDINATION
Pt asked to speak with sw.  She stated that she was interested in the crisis unit as a step down.

## 2024-02-25 NOTE — BH NOTE
[facility]  Day 3 Interdisciplinary Treatment Plan NOTE    Review Date & Time: 2/25/24 1000    Patient was not in treatment team    Estimated Length of Stay Update:  3-5 days  Estimated Discharge Date Update: 3-5 days    EDUCATION:   Learner Progress Toward Treatment Goals: Reviewed results and recommendations of this team, Reviewed group plan and strategies, Reviewed signs, symptoms and risk of self harm and violent behavior, and Reviewed goals and plan of care    Method: Small group    Outcome: Verbalized understanding    PATIENT GOALS: keep my comments to myself    PLAN/TREATMENT RECOMMENDATIONS UPDATE:Encourage patient to attend and participate in groups and take medications as prescribed.    GOALS UPDATE:   Time frame for Short-Term Goals: reassess daily      Gloria Payne RN

## 2024-02-25 NOTE — GROUP NOTE
Group Therapy Note    Date: 2/25/2024    Group Start Time: 0755  Group End Time: 0810  Group Topic: Community Meeting    [facility] 2    Jaida Lam CTRS    Group Therapy Note    Date: 2/25/2024    Number of Participants: 12    Type of Group: Community Meeting    Wellness Binder Information  Module Name:  Community Meeting      Patient's Goal:  Patient will be oriented to the unit including but not limited expectations, staff, programming schedule.  Patient will be able to ID expectations of treatment.     Notes: Patient was a participant in community meeting, and was updated on expectations of the unit, staffing, programming schedule, and acclimated to their environment.    Patient shared goal for today as \"keep my comments to myself\"    Status After Intervention:  Improved    Participation Level: Active Listener and Interactive    Participation Quality: Appropriate and Attentive      Speech:  normal      Thought Process/Content: Logical      Affective Functioning: Congruent      Mood: euthymic      Level of consciousness:  Alert and Attentive      Response to Learning: Able to verbalize current knowledge/experience, Able to verbalize/acknowledge new learning, and Progressing to goal      Endings: None Reported    Modes of Intervention: Education, Exploration, and Problem-solving      Discipline Responsible: Recreational Therapist      Signature:  SOPHIA Upton

## 2024-02-25 NOTE — GROUP NOTE
Date: 2/25/2024  Start Time: 1835  End Time:  1855  Number of Participants: 11    Type of Group: wrap up    Wellness Binder Information  Module Name:  Wrap Up    Patient's Goal:  To reflect on accomplishments of the day and focus on the positive of the day.  Patient will be able to ID if they met their goal for the day, and if not what barriers/improvements can be made to accomplish it.     Notes:  Patient reflected on accomplishments of the day and if they met their goal for the day.  Patient was able to ID the positives of the day and any barriers/improvements that can be made tomorrow.  Patient was an active participant, and responsive during wrap up session.  Patient reports she met her goal 50% of keeping her comments to herself.  She reports she can do better tomorrow.     Status After Intervention:  Improved    Participation Level: Active Listener and Interactive    Participation Quality: Appropriate and Attentive      Speech:  normal      Thought Process/Content: Logical      Affective Functioning: Congruent      Mood: euthymic      Level of consciousness:  Alert and Attentive      Response to Learning: Able to verbalize current knowledge/experience, Able to verbalize/acknowledge new learning, and Progressing to goal      Endings: None Reported    Modes of Intervention: Education, Exploration, and Problem-solving      Discipline Responsible: Recreational Therapist      Signature:  SOPHIA Upton

## 2024-02-25 NOTE — BH NOTE
Patient is resting quietly in bed with eyes closed at this time. No signs of distress or discomfort noted. No PRN medications given at this time. Safety needs met. No unit problems reported. Q 15 minute rounding continued.

## 2024-02-26 PROCEDURE — 6370000000 HC RX 637 (ALT 250 FOR IP): Performed by: NURSE PRACTITIONER

## 2024-02-26 PROCEDURE — 6370000000 HC RX 637 (ALT 250 FOR IP): Performed by: PSYCHIATRY & NEUROLOGY

## 2024-02-26 PROCEDURE — 1240000000 HC EMOTIONAL WELLNESS R&B

## 2024-02-26 PROCEDURE — 6370000000 HC RX 637 (ALT 250 FOR IP): Performed by: EMERGENCY MEDICINE

## 2024-02-26 PROCEDURE — 99232 SBSQ HOSP IP/OBS MODERATE 35: CPT | Performed by: NURSE PRACTITIONER

## 2024-02-26 RX ADMIN — CEFDINIR 300 MG: 300 CAPSULE ORAL at 21:15

## 2024-02-26 RX ADMIN — HYDROXYZINE PAMOATE 50 MG: 25 CAPSULE ORAL at 21:15

## 2024-02-26 RX ADMIN — MELATONIN 3 MG ORAL TABLET 3 MG: 3 TABLET ORAL at 21:14

## 2024-02-26 RX ADMIN — ARIPIPRAZOLE 10 MG: 10 TABLET ORAL at 09:03

## 2024-02-26 RX ADMIN — CEFDINIR 300 MG: 300 CAPSULE ORAL at 09:03

## 2024-02-26 NOTE — CARE COORDINATION
SW was informed by NP that pt would like to discharge to CSU when she leaves the hospital. Pt navigator states that SW will need to contact Jeffrey Linton at [facility]  ext [phone] [facility]     JT contacted Jeffrey Linton at [facility]  ext [phone] [facility] No answer, JT left voicemail.

## 2024-02-26 NOTE — GROUP NOTE
Group Therapy Note    Date: 2/26/2024  Start Time: 0750  End Time:  0810  Number of Participants: 13    Type of Group: Community Meeting    Wellness Binder Information  Module Name:  Community Meeting      Patient's Goal:  Patient will be oriented to the unit including but not limited expectations, staff, programming schedule.  Patient will be able to ID expectations of treatment.     Notes: Patient was a participant in community meeting, and was updated on expectations of the unit, staffing, programming schedule, and acclimated to their environment.    Patient shared goal for today as \"keep my comments to myself\"    Status After Intervention:  Improved    Participation Level: Active Listener and Interactive    Participation Quality: Appropriate and Attentive      Speech:  normal      Thought Process/Content: Logical      Affective Functioning: Congruent      Mood: euthymic      Level of consciousness:  Alert and Attentive      Response to Learning: Able to verbalize current knowledge/experience, Able to verbalize/acknowledge new learning, and Progressing to goal      Endings: None Reported    Modes of Intervention: Education, Exploration, and Problem-solving      Discipline Responsible: Recreational Therapist      Signature:  SOPHIA Upton

## 2024-02-26 NOTE — CARE COORDINATION
Patient is NOT a [facility] resident and will not be eligible for CSU services.  She is a resident of [facility]. She would be eligible to be referred to [facility], Jeffrey Linton, for bed day approval for [facility].    Patient address:  [address] [address]     Jeffrey Mirshai  Phone: [phone] EXT [phone]  Fax: [phone]    Electronically signed by ANNY Wood, LSW on 2/26/2024 at 9:59 AM

## 2024-02-26 NOTE — GROUP NOTE
Group Therapy Note    Date: 2/26/2024    Group Start Time: 1045  Group End Time: 1120  Group Topic: Psychoeducation    [facility] 2    Jody Kulkarni CTRS    Group Therapy Note    Attendees: 14    Date: 2/26/2024  Start Time: 1045  End Time:  1120  Number of Participants: 14    Type of Group: Psychoeducation    Name:  Self-Esteem    Patient's Goal:  ID what is self-esteem, influences of self-esteem, how self-esteem affects mental health and how to improve self-esteem.    Notes:  CTRS lead educational group discussion on self-esteem. Encouraged patients to share their experiences. Patient was actively engaged in group discussion.    Status After Intervention:  Improved    Participation Level: Active Listener and Interactive    Participation Quality: Appropriate, Attentive, Sharing, and Supportive      Speech:  normal      Thought Process/Content: Logical  Linear      Affective Functioning: Congruent      Mood:  Appropriate      Level of consciousness:  Alert and Attentive      Response to Learning: Able to verbalize current knowledge/experience, Able to verbalize/acknowledge new learning, Able to retain information, Capable of insight, Able to change behavior, and Progressing to goal      Endings: None Reported    Modes of Intervention: Education, Support, Socialization, Exploration, Clarifying, and Problem-solving      Discipline Responsible: Psychoeducational Specialist      Signature:  SOPHIA Dillon

## 2024-02-26 NOTE — GROUP NOTE
Group Therapy Note    Date: 2/26/2024    Group Start Time: 1545  Group End Time: 1630  Group Topic: Recreational    [facility] 2    Jaida Lam CTRS    Date: 2/26/2024  Start Time: 1545  End Time:  1630  Number of Participants: 7    Type of Group: Recreational    Wellness Binder Information  Module Name:  Rubio    Patient's Goal:  To increase socialization amongst peers.  Patient will be able to maintain attention to the game.     Notes:  CTRS facilitated the game Rubio.  Patient was an active participant in game and was able to maintain attention.       Status After Intervention:  Improved    Participation Level: Active Listener and Interactive    Participation Quality: Appropriate and Attentive      Speech:  normal      Thought Process/Content: Logical      Affective Functioning: Congruent      Mood: euthymic      Level of consciousness:  Alert and Attentive      Response to Learning: Able to verbalize current knowledge/experience, Able to verbalize/acknowledge new learning, and Progressing to goal      Endings: None Reported    Modes of Intervention: Education, Socialization, and Activity      Discipline Responsible: Recreational Therapist      Signature:  SOPHIA Upton

## 2024-02-26 NOTE — GROUP NOTE
Date: 2/26/2024  Start Time: 1820  End Time:  1835  Number of Participants: 9    Type of Group: wrap up    Wellness Binder Information  Module Name:  Wrap Up    Patient's Goal:  To reflect on accomplishments of the day and focus on the positive of the day.  Patient will be able to ID if they met their goal for the day, and if not what barriers/improvements can be made to accomplish it.     Notes:  Patient reflected on accomplishments of the day and if they met their goal for the day.  Patient was able to ID the positives of the day and any barriers/improvements that can be made tomorrow.  Patient was an active participant, and responsive during wrap up session.   Patient reports she felt like she did not accomplish her goal of keeping her comments to herself.  She reports she needs to think before she speaks from now on.     Status After Intervention:  Improved    Participation Level: Active Listener and Interactive    Participation Quality: Appropriate and Attentive      Speech:  normal      Thought Process/Content: Logical      Affective Functioning: Congruent      Mood: euthymic      Level of consciousness:  Alert and Attentive      Response to Learning: Able to verbalize current knowledge/experience, Able to verbalize/acknowledge new learning, and Progressing to goal      Endings: None Reported    Modes of Intervention: Education, Exploration, and Problem-solving      Discipline Responsible: Recreational Therapist      Signature:  SOPHIA Upton

## 2024-02-26 NOTE — DISCHARGE INSTRUCTIONS
Follow up for Tobacco Cessation at:    [facility]                                 Date:  Friday 3/1 at 10am              [address]    [place] [address]   (Inside [facility]    take B elevators to 7th floor)   Phone: [phone]   Fax: [phone]

## 2024-02-26 NOTE — CARE COORDINATION
SW received call from Jeffrey Linton at [place] [phone] ext [phone] to discuss referral. He states that they always struggle with bed availability at this facility and they have a hard time getting people into this facility. He states that he will reach out to this facility to see if they have bed availability and keep SW updated.

## 2024-02-26 NOTE — CARE COORDINATION
SW received voicemail from Jeffrey Linton at [facility] stating that their CSU does not have bed availability and they are unsure when beds will become available, so they will not be able to assist with pt discharge plan at this time.     SW met with pt to discuss discharge plan. Pt found resting in bed. She is flat and blunted, depressed. She states that she does not want substance abuse rehab at this time. She states that's he wants to go to a homeless shelter in [place], preferable the [facility] in [place] or [facility]. She denied SI/HI/AVH. She would like to use her [employer] transportation and plans to follow up with [facility] as she is active with them outpatient. Pt also agreed to sign COLT for friend Anurag who lives in [place] . She is not able to live with Anurag.     SW contacted [facility] [phone] to discuss pt case. JT spoke with Courtney Walker who states that pt is able to come to their facility and they should have a bed available for her.     SW contacted [facility]  to schedule appointment for pt. Pt has appointment via phone 2/29 at 1:40pm for mental health medication management.

## 2024-02-27 VITALS
SYSTOLIC BLOOD PRESSURE: 116 MMHG | BODY MASS INDEX: 31.65 KG/M2 | HEIGHT: 65 IN | TEMPERATURE: 97.6 F | RESPIRATION RATE: 18 BRPM | DIASTOLIC BLOOD PRESSURE: 82 MMHG | OXYGEN SATURATION: 97 % | WEIGHT: 190 LBS | HEART RATE: 79 BPM

## 2024-02-27 PROCEDURE — 99239 HOSP IP/OBS DSCHRG MGMT >30: CPT | Performed by: NURSE PRACTITIONER

## 2024-02-27 PROCEDURE — 6360000002 HC RX W HCPCS: Performed by: PSYCHIATRY & NEUROLOGY

## 2024-02-27 PROCEDURE — 6370000000 HC RX 637 (ALT 250 FOR IP): Performed by: NURSE PRACTITIONER

## 2024-02-27 PROCEDURE — 6370000000 HC RX 637 (ALT 250 FOR IP): Performed by: EMERGENCY MEDICINE

## 2024-02-27 PROCEDURE — G0008 ADMIN INFLUENZA VIRUS VAC: HCPCS | Performed by: PSYCHIATRY & NEUROLOGY

## 2024-02-27 PROCEDURE — 90686 IIV4 VACC NO PRSV 0.5 ML IM: CPT | Performed by: PSYCHIATRY & NEUROLOGY

## 2024-02-27 RX ORDER — CEFDINIR 300 MG/1
300 CAPSULE ORAL EVERY 12 HOURS SCHEDULED
Qty: 20 CAPSULE | Refills: 0 | Status: SHIPPED | OUTPATIENT
Start: 2024-02-22 | End: 2024-03-03

## 2024-02-27 RX ORDER — ARIPIPRAZOLE 10 MG/1
10 TABLET ORAL DAILY
Qty: 30 TABLET | Refills: 0 | Status: SHIPPED | OUTPATIENT
Start: 2024-02-28 | End: 2024-03-29

## 2024-02-27 RX ADMIN — INFLUENZA VIRUS VACCINE 0.5 ML: 15; 15; 15; 15 SUSPENSION INTRAMUSCULAR at 12:14

## 2024-02-27 RX ADMIN — ARIPIPRAZOLE 10 MG: 10 TABLET ORAL at 08:43

## 2024-02-27 RX ADMIN — CEFDINIR 300 MG: 300 CAPSULE ORAL at 08:43

## 2024-02-27 NOTE — TRANSITION OF CARE
Behavioral Health Transition Record to Provider    Patient Name: Fransisca Galindo  YOB: 2000   Medical Record Number: [id number]  Date of Admission: 2/22/2024  4:29 AM   Date of Discharge: 2/27/24    Attending Provider: Leuxs Romero MD   Discharging Provider: Dr. Romero  To contact this individual call [phone] and ask the  to page.  If unavailable, ask to be transferred to Behavioral Health Provider on call.  A Behavioral Health Provider will be available on call 24/7 and during holidays.    Primary Care Provider: Adriana Ortega    Allergies   Allergen Reactions    Penicillins Rash    Sulfa Antibiotics Rash       Reason for Admission: Fransisca Galindo is a 23 year old female with history of bipolar disorder, drug abuse, homelessness, and past inpatient psychiatric hospitalization at the [facility] for ,  presented to [facility] ED after several days of suicidal ideations with a plan to overdose or jump in front of a train. She was pink slipped in the emergency department.Triggering events leading to hospitalization are drug use relationship problem, homelessness and recently lost custody of her child.      Admission Diagnosis: Mood disorder (HCC) [F39]  UTI (urinary tract infection), uncomplicated [N39.0]  MDD (major depressive disorder), recurrent episode, moderate (HCC) [F33.1]    * No surgery found *    Results for orders placed or performed during the hospital encounter of 02/22/24   CBC with Auto Differential   Result Value Ref Range    WBC 8.8 4.5 - 11.5 k/uL    RBC 4.89 3.50 - 5.50 m/uL    Hemoglobin 11.9 11.5 - 15.5 g/dL    Hematocrit 36.4 34.0 - 48.0 %    MCV 74.4 (L) 80.0 - 99.9 fL    MCH 24.3 (L) 26.0 - 35.0 pg    MCHC 32.7 32.0 - 34.5 g/dL    RDW 14.6 11.5 - 15.0 %    Platelets 320 130 - 450 k/uL    MPV 10.3 7.0 - 12.0 fL    Neutrophils % 58 43.0 - 80.0 %    Lymphocytes % 30 20.0 - 42.0 %    Monocytes % 10 2.0 - 12.0 %    Eosinophils % 1 0 - 6 %    Basophils % 0 0.0 - 2.0 %

## 2024-02-27 NOTE — PLAN OF CARE
Problem: Behavior  Goal: Pt/Family maintain appropriate behavior and adhere to behavioral management agreement, if implemented  Description: INTERVENTIONS:  1. Assess patient/family's coping skills and  non-compliant behavior (including use of illegal substances)  2. Notify security of behavior or suspected illegal substances which indicate the need for search of the family and/or belongings  3. Encourage verbalization of thoughts and concerns in a socially appropriate manner  4. Utilize positive, consistent limit setting strategies supporting safety of patient, staff and others  5. Encourage participation in the decision making process about the behavioral management agreement  6. If a visitor's behavior poses a threat to safety call refer to organization policy.  7. Initiate consult with , Psychosocial CNS, Spiritual Care as appropriate  Outcome: Not Progressing     
  Problem: Behavior  Goal: Pt/Family maintain appropriate behavior and adhere to behavioral management agreement, if implemented  Description: INTERVENTIONS:  1. Assess patient/family's coping skills and  non-compliant behavior (including use of illegal substances)  2. Notify security of behavior or suspected illegal substances which indicate the need for search of the family and/or belongings  3. Encourage verbalization of thoughts and concerns in a socially appropriate manner  4. Utilize positive, consistent limit setting strategies supporting safety of patient, staff and others  5. Encourage participation in the decision making process about the behavioral management agreement  6. If a visitor's behavior poses a threat to safety call refer to organization policy.  7. Initiate consult with , Psychosocial CNS, Spiritual Care as appropriate  Outcome: Progressing     Problem: Depression/Self Harm  Goal: Effect of psychiatric condition will be minimized and patient will be protected from self harm  Description: INTERVENTIONS:  1. Assess impact of patient's symptoms on level of functioning, self care needs and offer support as indicated  2. Assess patient/family knowledge of depression, impact on illness and need for teaching  3. Provide emotional support, presence and reassurance  4. Assess for possible suicidal thoughts or ideation. If patient expresses suicidal thoughts or statements do not leave alone, initiate Suicide Precautions, move to a room close to the nursing station and obtain sitter  5. Initiate consults as appropriate with Mental Health Professional, Spiritual Care, Psychosocial CNS, and consider a recommendation to the [facility] for a Psychiatric Consultation  Outcome: Progressing     Patient has been out on the unit. Denies suicidal ideations, stating \"not right now\". Denies homicidal ideations and hallucinations. Social with select peers. Cooperative during assessment. Affect is flat and mood 
  Problem: Depression/Self Harm  Goal: Effect of psychiatric condition will be minimized and patient will be protected from self harm  Description: INTERVENTIONS:  1. Assess impact of patient's symptoms on level of functioning, self care needs and offer support as indicated  2. Assess patient/family knowledge of depression, impact on illness and need for teaching  3. Provide emotional support, presence and reassurance  4. Assess for possible suicidal thoughts or ideation. If patient expresses suicidal thoughts or statements do not leave alone, initiate Suicide Precautions, move to a room close to the nursing station and obtain sitter  5. Initiate consults as appropriate with Mental Health Professional, Spiritual Care, Psychosocial CNS, and consider a recommendation to the [facility] for a Psychiatric Consultation  2/23/2024 2125 by Yuridia Boudreaux, RN  Outcome: Not Progressing  Flowsheets (Taken 2/23/2024 2125)  Effect of psychiatric condition will be minimized and patient will be protected from self harm: Provide emotional support, presence and reassurance  2/23/2024 1045 by Halina Euceda, RN  Outcome: Progressing  Flowsheets (Taken 2/23/2024 1036)  Effect of psychiatric condition will be minimized and patient will be protected from self harm: Provide emotional support, presence and reassurance     
Alert and oriented x4.  States he has not been sleeping well lately and just needs to rest right now.  When asked if she is experiencing SI states \"Not right now\"  she does contract for safety.  Denies HI and does not report auditory or visual hallucinations at this time.  She did eat breakfast this morning but returned to her room to sleep.  Verified with patient that she does not take medications at home and does not report any past medical history.        Problem: Risk for Elopement  Goal: Patient will not exit the unit/facility without proper excort  Outcome: Progressing     Problem: Coping  Goal: Pt/Family able to verbalize concerns and demonstrate effective coping strategies  Description: INTERVENTIONS:  1. Assist patient/family to identify coping skills, available support systems and cultural and spiritual values  2. Provide emotional support, including active listening and acknowledgement of concerns of patient and caregivers  3. Reduce environmental stimuli, as able  4. Instruct patient/family in relaxation techniques, as appropriate  5. Assess for spiritual pain/suffering and initiate Spiritual Care, Psychosocial Clinical Specialist consults as needed  Outcome: Progressing     Problem: Decision Making  Goal: Pt/Family able to effectively weigh alternatives and participate in decision making related to treatment and care  Description: INTERVENTIONS:  1. Determine when there are differences between patient's view, family's view, and healthcare provider's view of condition  2. Facilitate patient and family articulation of goals for care  3. Help patient and family identify pros/cons of alternative solutions  4. Provide information as requested by patient/family  5. Respect patient/family right to receive or not to receive information  6. Serve as a liaison between patient and family and health care team  7. Initiate Consults from Ethics, Palliative Care or initiate Family Care Conference as is 
Patient denies SI/HI/AVH. Denies anxiety and depression. C/O nausea but has decreased since yesterday. Appears flat and blunt. In control of behaviors. Will continue to monitor and intervene as needed.   Problem: Risk for Elopement  Goal: Patient will not exit the unit/facility without proper excort  2/26/2024 0817 by Kandy Hernandez, RN  Outcome: Progressing     Problem: Coping  Goal: Pt/Family able to verbalize concerns and demonstrate effective coping strategies  Description: INTERVENTIONS:  1. Assist patient/family to identify coping skills, available support systems and cultural and spiritual values  2. Provide emotional support, including active listening and acknowledgement of concerns of patient and caregivers  3. Reduce environmental stimuli, as able  4. Instruct patient/family in relaxation techniques, as appropriate  5. Assess for spiritual pain/suffering and initiate Spiritual Care, Psychosocial Clinical Specialist consults as needed  Outcome: Progressing     Problem: Decision Making  Goal: Pt/Family able to effectively weigh alternatives and participate in decision making related to treatment and care  Description: INTERVENTIONS:  1. Determine when there are differences between patient's view, family's view, and healthcare provider's view of condition  2. Facilitate patient and family articulation of goals for care  3. Help patient and family identify pros/cons of alternative solutions  4. Provide information as requested by patient/family  5. Respect patient/family right to receive or not to receive information  6. Serve as a liaison between patient and family and health care team  7. Initiate Consults from Ethics, Palliative Care or initiate Family Care Conference as is appropriate  Outcome: Progressing     Problem: Behavior  Goal: Pt/Family maintain appropriate behavior and adhere to behavioral management agreement, if implemented  Description: INTERVENTIONS:  1. Assess patient/family's coping skills 
Patient denies SI/HI/AVH. Reports 5/10 anxiety and 1/10 depression. Appears flat and depressed on approach but brightens on conversation. Less detached and more cooperative with today's assessment as compared to the previous day. C/O headache and nausea, PRN Tylenol and Milk of Magnesia administered per patient request. Reports sleep and appetite are good. Patient is out on the unit, social with peers, and is medication compliant. Q 15 minute rounding maintained. Will continue to monitor and intervene as needed.     Problem: Risk for Elopement  Goal: Patient will not exit the unit/facility without proper excort  2/25/2024 2317 by Kandy Hernandez, RN  Outcome: Progressing     Problem: Behavior  Goal: Pt/Family maintain appropriate behavior and adhere to behavioral management agreement, if implemented  Description: INTERVENTIONS:  1. Assess patient/family's coping skills and  non-compliant behavior (including use of illegal substances)  2. Notify security of behavior or suspected illegal substances which indicate the need for search of the family and/or belongings  3. Encourage verbalization of thoughts and concerns in a socially appropriate manner  4. Utilize positive, consistent limit setting strategies supporting safety of patient, staff and others  5. Encourage participation in the decision making process about the behavioral management agreement  6. If a visitor's behavior poses a threat to safety call refer to organization policy.  7. Initiate consult with , Psychosocial CNS, Spiritual Care as appropriate  2/25/2024 2317 by Kandy Hernandez, RN  Outcome: Progressing     Problem: Depression/Self Harm  Goal: Effect of psychiatric condition will be minimized and patient will be protected from self harm  Description: INTERVENTIONS:  1. Assess impact of patient's symptoms on level of functioning, self care needs and offer support as indicated  2. Assess patient/family knowledge of depression, impact on 
Pt denies suicidal ideation, homicidal ideation, and AV hallucinations. She is calm, cooperative, and friendly. She is flat but does brighten with conversation. She is sleeping well. She reports anxiety is 0/10 and depression is 0/10. She is compliant with medications and is attending groups.    Problem: Risk for Elopement  Goal: Patient will not exit the unit/facility without proper excort  2/27/2024 1028 by Gloria Payne RN  Outcome: Progressing     Problem: Coping  Goal: Pt/Family able to verbalize concerns and demonstrate effective coping strategies  Description: INTERVENTIONS:  1. Assist patient/family to identify coping skills, available support systems and cultural and spiritual values  2. Provide emotional support, including active listening and acknowledgement of concerns of patient and caregivers  3. Reduce environmental stimuli, as able  4. Instruct patient/family in relaxation techniques, as appropriate  5. Assess for spiritual pain/suffering and initiate Spiritual Care, Psychosocial Clinical Specialist consults as needed  2/27/2024 1028 by Gloria Payne RN  Outcome: Progressing     Problem: Decision Making  Goal: Pt/Family able to effectively weigh alternatives and participate in decision making related to treatment and care  Description: INTERVENTIONS:  1. Determine when there are differences between patient's view, family's view, and healthcare provider's view of condition  2. Facilitate patient and family articulation of goals for care  3. Help patient and family identify pros/cons of alternative solutions  4. Provide information as requested by patient/family  5. Respect patient/family right to receive or not to receive information  6. Serve as a liaison between patient and family and health care team  7. Initiate Consults from Ethics, Palliative Care or initiate Family Care Conference as is appropriate  2/27/2024 1028 by Gloria Payne, RN  Outcome: Progressing     Problem: 
Pt denies suicidal ideation, homicidal ideation, and AV hallucinations. She is flat and apathetic. She denies having any depression and denies having any anxiety. She does not offer any conversation. She is compliant with morning medications. She has had no behaviors at this time.     Problem: Depression/Self Harm  Goal: Effect of psychiatric condition will be minimized and patient will be protected from self harm  Description: INTERVENTIONS:  1. Assess impact of patient's symptoms on level of functioning, self care needs and offer support as indicated  2. Assess patient/family knowledge of depression, impact on illness and need for teaching  3. Provide emotional support, presence and reassurance  4. Assess for possible suicidal thoughts or ideation. If patient expresses suicidal thoughts or statements do not leave alone, initiate Suicide Precautions, move to a room close to the nursing station and obtain sitter  5. Initiate consults as appropriate with Mental Health Professional, Spiritual Care, Psychosocial CNS, and consider a recommendation to the [facility] for a Psychiatric Consultation  2/24/2024 1009 by Gloria Payne, RN  Outcome: Progressing  2/23/2024 2125 by Yuridia Boudreaux, RN  Outcome: Not Progressing  Flowsheets (Taken 2/23/2024 2125)  Effect of psychiatric condition will be minimized and patient will be protected from self harm: Provide emotional support, presence and reassurance     
appropriate  2/25/2024 0957 by Gloria Payne RN  Outcome: Progressing     Problem: Behavior  Goal: Pt/Family maintain appropriate behavior and adhere to behavioral management agreement, if implemented  Description: INTERVENTIONS:  1. Assess patient/family's coping skills and  non-compliant behavior (including use of illegal substances)  2. Notify security of behavior or suspected illegal substances which indicate the need for search of the family and/or belongings  3. Encourage verbalization of thoughts and concerns in a socially appropriate manner  4. Utilize positive, consistent limit setting strategies supporting safety of patient, staff and others  5. Encourage participation in the decision making process about the behavioral management agreement  6. If a visitor's behavior poses a threat to safety call refer to organization policy.  7. Initiate consult with , Psychosocial CNS, Spiritual Care as appropriate  2/25/2024 0957 by Gloria Payne RN  Outcome: Progressing     Problem: Depression/Self Harm  Goal: Effect of psychiatric condition will be minimized and patient will be protected from self harm  Description: INTERVENTIONS:  1. Assess impact of patient's symptoms on level of functioning, self care needs and offer support as indicated  2. Assess patient/family knowledge of depression, impact on illness and need for teaching  3. Provide emotional support, presence and reassurance  4. Assess for possible suicidal thoughts or ideation. If patient expresses suicidal thoughts or statements do not leave alone, initiate Suicide Precautions, move to a room close to the nursing station and obtain sitter  5. Initiate consults as appropriate with Mental Health Professional, Spiritual Care, Psychosocial CNS, and consider a recommendation to the [facility] for a Psychiatric Consultation  2/25/2024 0957 by Gloria Payne RN  Outcome: Progressing     Problem: Abuse/Neglect  Goal: Pt/Caregiver aware of 
confession, anointing, smudging).  10. Refer patient/family to formal counseling and/or to [facility] for further support work.  Outcome: Progressing     Problem: Involuntary Admit  Goal: Will cooperate with staff recommendations and doctor's orders and will demonstrate appropriate behavior  Description: INTERVENTIONS:  1. Treat underlying conditions and offer medication as ordered  2. Educate regarding involuntary admission procedures and rules  3. Contain excessive/inappropriate behavior per unit and hospital policies  Outcome: Progressing     Problem: Pain  Goal: Verbalizes/displays adequate comfort level or baseline comfort level  Outcome: Progressing

## 2024-02-27 NOTE — DISCHARGE SUMMARY
drug-to-drug interactions and alternatives to treatment were discussed.    Encourage patient to attend outpatient follow up appointment and therapy, scheduled prior to discharge.    Patient was advised to call the outpatient provider, visit the nearest ED or call 911 if symptoms are not manageable.     Patient's family member was contacted prior to the discharge she was discharged to the [facility] and [place] and psychiatrically stable condition.         Medication List      You have not been prescribed any medications.       NOTE: This report was transcribed using voice recognition software. Every effort was made to ensure accuracy; however, inadvertent computerized transcription errors may be present.    TIME SPEND - 35 MINUTES TO COMPLETE THE EVALUATION, DISCHARGE SUMMARY, MEDICATION RECONCILIATION AND FOLLOW UP CARE     Signed:  Tricia Aviles  2/27/2024  9:31 AM

## 2024-02-27 NOTE — GROUP NOTE
Group Therapy Note    Date: 2/27/2024  Start Time: 0750  End Time:  0810  Number of Participants: 9    Type of Group: Community Meeting    Wellness Binder Information  Module Name:  Community Meeting      Patient's Goal:  Patient will be oriented to the unit including but not limited expectations, staff, programming schedule.  Patient will be able to ID expectations of treatment.     Notes: Patient was a participant in community meeting, and was updated on expectations of the unit, staffing, programming schedule, and acclimated to their environment.    Patient shared goal for today as \"try to stay up\"    Status After Intervention:  Improved    Participation Level: Active Listener and Interactive    Participation Quality: Appropriate and Attentive      Speech:  normal      Thought Process/Content: Logical      Affective Functioning: Congruent      Mood: euthymic      Level of consciousness:  Alert and Attentive      Response to Learning: Able to verbalize current knowledge/experience, Able to verbalize/acknowledge new learning, and Progressing to goal      Endings: None Reported    Modes of Intervention: Education, Exploration, and Problem-solving      Discipline Responsible: Recreational Therapist      Signature:  SOPHIA Upton

## 2024-02-27 NOTE — PROGRESS NOTES
BEHAVIORAL HEALTH FOLLOW-UP NOTE     2/24/2024     Patient was seen and examined in person, Chart reviewed   Patient's case discussed with staff/team    Chief Complaint: Blunt, depressed, hopeless    Interim History:     Is resting in her room this morning she minimally engages in conversation with me she is uninterested somewhat dismissive.  Providing minimal answers and is not really elaborating in conversation with me.  I have to prompt her to engage with me.  She is denying suicidal ideation today but then states it does come and go.  She states her mood is low and she continues to feel helpless and hopeless regarding her situation and her future.  She states that she is tolerating the medications.  She has been observed to come out of her room.  States appetite is okay and sleep is improving    Appetite:   [x] Normal/Unchanged  [] Increased  [] Decreased      Sleep:       [] Normal/Unchanged  [x] Fair       [] Poor              Energy:    [] Normal/Unchanged  [] Increased  [x] Decreased        SI [x] Present -fleeting [] Absent    HI  []Present  [x] Absent     Aggression:  [] yes  [x] no    Patient is [x] able  [] unable to CONTRACT FOR SAFETY     PAST MEDICAL/PSYCHIATRIC HISTORY:   Past Medical History:   Diagnosis Date    Anxiety     Depression        FAMILY/SOCIAL HISTORY:  History reviewed. No pertinent family history.  Social History     Socioeconomic History    Marital status: Single     Spouse name: Not on file    Number of children: Not on file    Years of education: Not on file    Highest education level: Not on file   Occupational History    Not on file   Tobacco Use    Smoking status: Some Days     Types: Cigarettes    Smokeless tobacco: Never   Vaping Use    Vaping Use: Every day    Substances: Nicotine    Devices: Disposable   Substance and Sexual Activity    Alcohol use: Not Currently    Drug use: Yes     Types: Methamphetamines (Crystal Meth)    Sexual activity: Not Currently   Other Topics 
BEHAVIORAL HEALTH FOLLOW-UP NOTE     2/26/2024     Patient was seen and examined in person, Chart reviewed   Patient's case discussed with staff/team      I personally seen and assessed the patient today agree with the below assessment evaluations recommendations by the medical student  Mental status examination reveals a 23-year-old female average grooming and hygiene, cooperative for assessment. Psychomotor reveals no a abnormality.. Speech is clear normal volume and tone.. Eye contact is good.  mood is depressed affect is congruent stated mood, brightens with conversation. Thought process linear goal-directed no looseness of association no flight of ideas. Thought content devoid of auditory or visual hallucinations there is no overt or covert sign of psychosis or paranoia. Denies suicidal or homicidal ideation intent or plan. Memory intact during conversation cognitive function baseline. Insight judgment improving. Alert and oriented to person place time situation.       Chief Complaint: Blunt, depressed, hopeless     Interim History:     Patient seen around the milieu this morning. She remains flat and blunted but her mood is incongruent and she states she is feeling well. Patient is informed of negative pregnancy test. She denies current suicidal or homicidal ideations intentions or plans. She denies AVH. She is minimally responsive but cooperative. She maintains good hygiene and grooming. Thought process is linear with no flight of ideas. She has been attending group therapies and interacting with peers. No behavioral issues at this time.     Appetite:   [x] Normal/Unchanged  [] Increased  [] Decreased      Sleep:       [] Normal/Unchanged  [x] Fair       [] Poor              Energy:    [] Normal/Unchanged  [] Increased  [x] Decreased        SI [] Present [x] Absent    HI  []Present  [x] Absent     Aggression:  [] yes  [x] no    Patient is [x] able  [] unable to CONTRACT FOR SAFETY     PAST MEDICAL/PSYCHIATRIC 
[facility]  Initial Interdisciplinary Treatment Plan Note      Original treatment plan Date & Time: 02/23/2024     Admission Type:  Admission Type: Involuntary    Reason for admission:   Reason for Admission: SI    Estimated Length of Stay:  5-7days  Estimated Discharge Date: To be determined by physician.    PATIENT STRENGTHS:  Patient Strengths:   Patient Strengths and Limitations:Limitations: Inappropriate/potentially harmful leisure interests, Tendency to isolate self, Difficult relationships / poor social skills, Demonstrates discomfort with /lack of social skills  Addictive Behavior: Addictive Behavior  In the Past 3 Months, Have You Felt or Has Someone Told You That You Have a Problem With  : None  Medical Problems:  Past Medical History:   Diagnosis Date    Anxiety     Depression      Status EXAM:Mental Status and Behavioral Exam  Normal: No  Level of Assistance: Independent/Self  Facial Expression: Avoids Gaze, Flat, Sad, Worried  Affect: Blunt  Level of Consciousness: Alert  Frequency of Checks: 4 times per hour, close  Mood:Normal: No  Mood: Depressed, Anxious, Sad, Helpless  Motor Activity:Normal: Yes  Eye Contact: Poor  Observed Behavior: Cooperative  Sexual Misconduct History: Current - no  Preception: [place] to person, [place] to time, [place] to place, [place] to situation  Attention:Normal: No  Attention: Distractible  Thought Processes: Circumstantial  Thought Content:Normal: No  Thought Content: Preoccupations  Depression Symptoms: Change in energy level, Impaired concentration, Feelings of hopelessess, Feelings of helplessness, Isolative, Sleep disturbance  Anxiety Symptoms: Generalized  Pauline Symptoms: Poor judgment  Hallucinations: None  Delusions: No  Memory:Normal: Yes  Insight and Judgment: No  Insight and Judgment: Poor judgment, Poor insight    EDUCATION:   Learner Progress Toward Treatment Goals: Will review group plans and strategies for care.    Method: Group therapy, 
CLINICAL PHARMACY NOTE: MEDS TO BEDS    Total # of Prescriptions Filled: 2   The following medications were delivered to the patient:  Cefdinir 300 mg  Aripiprazole 10 mg    Additional Documentation:     Delivered meds to jacob cavanaugh on the unit  
During group another peer mentioned the access center and drop in center on [address].  Patient requested additional information on the help network center.  [facility] did print out a brochure on what is available through the help network and also a calendar of events at the access center.  Patient thanked [facility], and [facility] will continue to be available to patient as needed.   
Leisure assessment completed.  
Patient declined invitation to the following groups:    Wrap-Up    Patient will continue to be provided with opportunities to enhance leisure skills/interests and/or coping mechanisms.  
Patient declined verbal invitation to community meeting.  Patient will continue to be provided with opportunities to enhance leisure skills/interests and/or coping mechanisms.   
Patient resting quietly in bed with eyes closed. Respirations even and unlabored with no signs of distress observed. Environmental rounds continued.  
Patient resting quietly with eyes closed. No S/S of distress noted or observed. Prn medications vistaril , Melatonin given at this time. Will continue to monitor, provide needs and safe environment with continue rounding every 15 minutes.  
Patient resting quietly with eyes closed. No S/S of distress noted or observed. Prn medications vistaril , Melatonin given at this time. Will continue to monitor, provide needs and safe environment with continue rounding every 15 minutes.  
Patient was out on unit in group playing KEISHA card game with therapist and peers.  Approached desk after group.   Alert and oriented x 4.  Effect was blunt.  Observed  flat, but brightens with conversation.  Denies suicidal,homicidal or auditory,visual hallucinations.  Denies anxiety or depression.  Appetite is good.  Sleeping fine. Compliant with attend groups and medications ordered.  No behaviors observed or reported.  Q 15 minute safety rounds continue.    
Patient was out on unit, but remains to self.   Alert and oriented x 4.  Denies suicidal,homicidal or auditory,visual hallucinations.   Verbalizes anxiety 6 .  Denies depression.  Appetite is good.  Sleep is off and on and upon awaking still feels tired.  Attended select groups.  Compliant with evening medications.  Q 15 minute safety rounds continue  
Pt attended afternoon smoking cessation group. Pt appeared to be an active listener. Pt is able to share appropriately when prompted and asked facilitator relevant questions.  Pt was participant 1 of 7.    Electronically signed by Yuridia Gonzalez on 2/23/2024 at 3:20 PM     
found for: \"LABA1C\"    No results found for: \"CHOL\"  No results found for: \"TRIG\"  No results found for: \"HDL\"  No components found for: \"LDLCAL\"  No components found for: \"LABVLDL\"      Body mass index is 31.62 kg/m².    BP Readings from Last 2 Encounters:   02/22/24 (!) 102/51   02/19/24 115/65       Recliner Assessment:  Patient is able to demonstrate the ability to move from a reclining position to an upright position within the recliner.    Pt admitted with followings belongings:  Clothing:  (bagged belongings to be sent with [facility] staff)  The following personal items were collected during admission. Items secured in locker/safe. Items will be returned to patient at discharge.     Shawna Mijares RN       
present.  Electronically signed by Tricia Aviles on 2/25/2024 at 8:15 AM

## 2024-02-27 NOTE — BH NOTE
[facility]  Discharge Note    Pt discharged with followings belongings:   Dental Appliances: None  Vision - Corrective Lenses: None  Hearing Aid: None  Jewelry: Earrings  Body Piercings Removed: No  Clothing: Footwear, Jacket/Coat, Pants, Shirt, Socks, Undergarments (tank, 2 shirts, small ayush shirts and pants)  Other Valuables: Wallet, Keys, Personal Toiletries (makeup, bank cards, misc cards, mail/misc papers, brush/comb, lock, markers)   Valuables  returned to patient. Patient educated on aftercare instructions: yes  Patient verbalize understanding of AVS:  yes.    Status EXAM upon discharge:  Mental Status and Behavioral Exam  Normal: No  Level of Assistance: Independent/Self  Facial Expression: Flat, Brightened (brightens with conversation)  Affect: Appropriate  Level of Consciousness: Alert  Frequency of Checks: 4 times per hour, close  Mood:Normal: No  Mood: Anxious  Motor Activity:Normal: Yes  Eye Contact: Good  Observed Behavior: Cooperative, Friendly  Sexual Misconduct History: Current - no  Preception: [place] to person, [place] to time, [place] to place, [place] to situation  Attention:Normal: Yes  Attention: Distractible  Thought Processes: Unremarkable  Thought Content:Normal: Yes  Thought Content: Preoccupations  Depression Symptoms: No problems reported or observed.  Anxiety Symptoms: Generalized  Pauline Symptoms: No problems reported or observed.  Hallucinations: None  Delusions: No  Memory:Normal: Yes  Insight and Judgment: No  Insight and Judgment: Poor judgment    Tobacco Screening:  Practical Counseling, on admission, nicko X, if applicable and completed (first 3 are required if patient doesn't refuse)yes:            (X ) Recognizing danger situations (included triggers and roadblocks)                    ( X) Coping skills (new ways to manage stress,relaxation techniques, changing routine, distraction)                                                           ( X) Basic information

## 2024-02-27 NOTE — CARE COORDINATION
SW met with pt to discuss discharge. Pt is alert and oriented x4. Pt's thought process was linear, mood was euthymic and affect congruent. Pt's speech was normal, eye contact was good, and pt had good insight/judgement. Pt denied any SI,HI, and Hallucinations. Pt reported feeling ready to discharge and plans on discharging to [facility] in [place]. Pt reported her friend Dre will transport her to the facility. Pt had no other questions or concerns for SW at this time. SW confirmed with pt her upcoming Mental Health Medication for [facility] via phone on 2/29 at 1:40 pm.    JT contacted [facility]'s [phone] to see if there was an open bed for pt at discharge. They state pt is able to stay at [facility] in [place] and there is an open bed today and if there is no bed when pt arrives she will be placed in their overflow shelter..     JT contacted pt's friend Anurag [phone] (COLT Signed) to discuss discharge plan. Anurag denied having any concerns for pt's discharge. Anurag reported he has been talking with pt and believes she \"sounds better.\" Anurag has no concerns with pt being discharged to [place].    In order to ensure appropriate transition and discharge planning is in place, the following documents have been transmitted to [facility], as the new outpatient provider:    The d/c diagnosis was transmitted to the next care provider  The reason for hospitalization was transmitted to the next care provider  The d/c medications (dosage and indication) were transmitted to the next care provider   The continuing care plan was transmitted to the next care provider

## 2024-04-07 ENCOUNTER — HOSPITAL ENCOUNTER (EMERGENCY)
Facility: HOSPITAL | Age: 24
Discharge: OTHER INSTITUTION | End: 2024-04-07
Attending: FAMILY MEDICINE
Payer: COMMERCIAL

## 2024-04-07 VITALS
OXYGEN SATURATION: 97 % | SYSTOLIC BLOOD PRESSURE: 116 MMHG | HEART RATE: 78 BPM | HEIGHT: 64 IN | RESPIRATION RATE: 16 BRPM | BODY MASS INDEX: 38.13 KG/M2 | TEMPERATURE: 98 F | DIASTOLIC BLOOD PRESSURE: 77 MMHG | WEIGHT: 223.33 LBS

## 2024-04-07 DIAGNOSIS — R45.851 SUICIDAL IDEATION: ICD-10-CM

## 2024-04-07 DIAGNOSIS — F32.0 CURRENT MILD EPISODE OF MAJOR DEPRESSIVE DISORDER, UNSPECIFIED WHETHER RECURRENT (CMS-HCC): Primary | ICD-10-CM

## 2024-04-07 LAB
ALBUMIN SERPL BCP-MCNC: 4 G/DL (ref 3.4–5)
ALP SERPL-CCNC: 77 U/L (ref 33–110)
ALT SERPL W P-5'-P-CCNC: 18 U/L (ref 7–45)
AMPHETAMINES UR QL SCN: ABNORMAL
ANION GAP SERPL CALC-SCNC: 12 MMOL/L (ref 10–20)
APAP SERPL-MCNC: <10 UG/ML
APPEARANCE UR: CLEAR
AST SERPL W P-5'-P-CCNC: 17 U/L (ref 9–39)
BARBITURATES UR QL SCN: ABNORMAL
BASOPHILS # BLD AUTO: 0.03 X10*3/UL (ref 0–0.1)
BASOPHILS NFR BLD AUTO: 0.4 %
BENZODIAZ UR QL SCN: ABNORMAL
BILIRUB SERPL-MCNC: 0.4 MG/DL (ref 0–1.2)
BILIRUB UR STRIP.AUTO-MCNC: NEGATIVE MG/DL
BUN SERPL-MCNC: 11 MG/DL (ref 6–23)
BZE UR QL SCN: ABNORMAL
CALCIUM SERPL-MCNC: 9 MG/DL (ref 8.6–10.3)
CANNABINOIDS UR QL SCN: ABNORMAL
CHLORIDE SERPL-SCNC: 106 MMOL/L (ref 98–107)
CO2 SERPL-SCNC: 25 MMOL/L (ref 21–32)
COLOR UR: YELLOW
CREAT SERPL-MCNC: 0.66 MG/DL (ref 0.5–1.05)
EGFRCR SERPLBLD CKD-EPI 2021: >90 ML/MIN/1.73M*2
EOSINOPHIL # BLD AUTO: 0.2 X10*3/UL (ref 0–0.7)
EOSINOPHIL NFR BLD AUTO: 2.5 %
ERYTHROCYTE [DISTWIDTH] IN BLOOD BY AUTOMATED COUNT: 15 % (ref 11.5–14.5)
ETHANOL SERPL-MCNC: <10 MG/DL
FENTANYL+NORFENTANYL UR QL SCN: ABNORMAL
FLUAV RNA RESP QL NAA+PROBE: NOT DETECTED
FLUBV RNA RESP QL NAA+PROBE: NOT DETECTED
GLUCOSE SERPL-MCNC: 90 MG/DL (ref 74–99)
GLUCOSE UR STRIP.AUTO-MCNC: NEGATIVE MG/DL
HCG UR QL IA.RAPID: NEGATIVE
HCT VFR BLD AUTO: 37.2 % (ref 36–46)
HGB BLD-MCNC: 12.1 G/DL (ref 12–16)
HOLD SPECIMEN: NORMAL
IMM GRANULOCYTES # BLD AUTO: 0.01 X10*3/UL (ref 0–0.7)
IMM GRANULOCYTES NFR BLD AUTO: 0.1 % (ref 0–0.9)
KETONES UR STRIP.AUTO-MCNC: NEGATIVE MG/DL
LEUKOCYTE ESTERASE UR QL STRIP.AUTO: NEGATIVE
LYMPHOCYTES # BLD AUTO: 2.28 X10*3/UL (ref 1.2–4.8)
LYMPHOCYTES NFR BLD AUTO: 28.4 %
MCH RBC QN AUTO: 24.7 PG (ref 26–34)
MCHC RBC AUTO-ENTMCNC: 32.5 G/DL (ref 32–36)
MCV RBC AUTO: 76 FL (ref 80–100)
METHADONE UR QL SCN: ABNORMAL
MONOCYTES # BLD AUTO: 0.83 X10*3/UL (ref 0.1–1)
MONOCYTES NFR BLD AUTO: 10.3 %
NEUTROPHILS # BLD AUTO: 4.68 X10*3/UL (ref 1.2–7.7)
NEUTROPHILS NFR BLD AUTO: 58.3 %
NITRITE UR QL STRIP.AUTO: NEGATIVE
NRBC BLD-RTO: ABNORMAL /100{WBCS}
OPIATES UR QL SCN: ABNORMAL
OXYCODONE+OXYMORPHONE UR QL SCN: ABNORMAL
PCP UR QL SCN: ABNORMAL
PH UR STRIP.AUTO: 5 [PH]
PLATELET # BLD AUTO: 301 X10*3/UL (ref 150–450)
POTASSIUM SERPL-SCNC: 4.1 MMOL/L (ref 3.5–5.3)
PROT SERPL-MCNC: 7.2 G/DL (ref 6.4–8.2)
PROT UR STRIP.AUTO-MCNC: NEGATIVE MG/DL
RBC # BLD AUTO: 4.89 X10*6/UL (ref 4–5.2)
RBC # UR STRIP.AUTO: NEGATIVE /UL
SALICYLATES SERPL-MCNC: <3 MG/DL
SARS-COV-2 RNA RESP QL NAA+PROBE: NOT DETECTED
SODIUM SERPL-SCNC: 139 MMOL/L (ref 136–145)
SP GR UR STRIP.AUTO: 1.02
UROBILINOGEN UR STRIP.AUTO-MCNC: <2 MG/DL
WBC # BLD AUTO: 8 X10*3/UL (ref 4.4–11.3)

## 2024-04-07 PROCEDURE — 85025 COMPLETE CBC W/AUTO DIFF WBC: CPT | Performed by: FAMILY MEDICINE

## 2024-04-07 PROCEDURE — 80307 DRUG TEST PRSMV CHEM ANLYZR: CPT | Performed by: FAMILY MEDICINE

## 2024-04-07 PROCEDURE — 80143 DRUG ASSAY ACETAMINOPHEN: CPT | Performed by: FAMILY MEDICINE

## 2024-04-07 PROCEDURE — 36415 COLL VENOUS BLD VENIPUNCTURE: CPT | Performed by: FAMILY MEDICINE

## 2024-04-07 PROCEDURE — 81003 URINALYSIS AUTO W/O SCOPE: CPT | Mod: 59 | Performed by: FAMILY MEDICINE

## 2024-04-07 PROCEDURE — 80053 COMPREHEN METABOLIC PANEL: CPT | Performed by: FAMILY MEDICINE

## 2024-04-07 PROCEDURE — 99285 EMERGENCY DEPT VISIT HI MDM: CPT

## 2024-04-07 PROCEDURE — 87636 SARSCOV2 & INF A&B AMP PRB: CPT | Performed by: EMERGENCY MEDICINE

## 2024-04-07 PROCEDURE — 81025 URINE PREGNANCY TEST: CPT | Performed by: FAMILY MEDICINE

## 2024-04-07 SDOH — HEALTH STABILITY: MENTAL HEALTH: SUICIDAL BEHAVIOR (3 MONTHS): YES

## 2024-04-07 SDOH — HEALTH STABILITY: MENTAL HEALTH: SUICIDAL BEHAVIOR (LIFETIME): YES

## 2024-04-07 SDOH — HEALTH STABILITY: MENTAL HEALTH: SUICIDAL BEHAVIOR (DESCRIPTION): OVERDOSE

## 2024-04-07 SDOH — HEALTH STABILITY: MENTAL HEALTH: WISH TO BE DEAD (PAST 1 MONTH): YES

## 2024-04-07 SDOH — HEALTH STABILITY: MENTAL HEALTH: ACTIVE SUICIDAL IDEATION WITH SOME INTENT TO ACT, WITHOUT SPECIFIC PLAN (PAST 1 MONTH): YES

## 2024-04-07 SDOH — HEALTH STABILITY: MENTAL HEALTH: IN THE PAST WEEK, HAVE YOU BEEN HAVING THOUGHTS ABOUT KILLING YOURSELF?: YES

## 2024-04-07 SDOH — HEALTH STABILITY: MENTAL HEALTH: ACTIVE SUICIDAL IDEATION WITH SPECIFIC PLAN AND INTENT (PAST 1 MONTH): YES

## 2024-04-07 SDOH — HEALTH STABILITY: MENTAL HEALTH: NON-SPECIFIC ACTIVE SUICIDAL THOUGHTS (PAST 1 MONTH): YES

## 2024-04-07 SDOH — HEALTH STABILITY: MENTAL HEALTH: IN THE PAST FEW WEEKS, HAVE YOU FELT THAT YOU OR YOUR FAMILY WOULD BE BETTER OFF IF YOU WERE DEAD?: YES

## 2024-04-07 SDOH — HEALTH STABILITY: MENTAL HEALTH: DESCRIBE YOUR THOUGHTS OF KILLING YOURSELF RIGHT NOW:: CRASH CAR INTO A POLE OR WALK INTO TRAFFIC.

## 2024-04-07 SDOH — ECONOMIC STABILITY: GENERAL: FINANCIAL CONCERNS: UNABLE TO MEET BASIC NEEDS;TRANSPORTATION COSTS;UNABLE TO PAY BILLS

## 2024-04-07 SDOH — HEALTH STABILITY: MENTAL HEALTH: WHEN DID YOU TRY TO KILL YOURSELF?: "A FEW MONTHS AGO", PER PATIENT REPORT.

## 2024-04-07 SDOH — HEALTH STABILITY: MENTAL HEALTH: ANXIETY SYMPTOMS: GENERALIZED

## 2024-04-07 SDOH — HEALTH STABILITY: MENTAL HEALTH: HAVE YOU EVER TRIED TO KILL YOURSELF?: YES

## 2024-04-07 SDOH — HEALTH STABILITY: MENTAL HEALTH: ARE YOU HAVING THOUGHTS OF KILLING YOURSELF RIGHT NOW?: YES

## 2024-04-07 SDOH — HEALTH STABILITY: MENTAL HEALTH: IN THE PAST FEW WEEKS, HAVE YOU WISHED YOU WERE DEAD?: YES

## 2024-04-07 SDOH — HEALTH STABILITY: MENTAL HEALTH: HOW DID YOU TRY TO KILL YOURSELF?: OVERDOSE

## 2024-04-07 SDOH — ECONOMIC STABILITY: HOUSING INSECURITY: FEELS SAFE LIVING IN HOME: NO

## 2024-04-07 SDOH — HEALTH STABILITY: MENTAL HEALTH
DEPRESSION SYMPTOMS: CHANGE IN ENERGY LEVEL;FEELINGS OF HELPLESSNESS;FEELINGS OF HOPELESSESS;ISOLATIVE;LOSS OF INTEREST;SLEEP DISTURBANCE

## 2024-04-07 ASSESSMENT — LIFESTYLE VARIABLES
SUBSTANCE_ABUSE_PAST_12_MONTHS: YES
PRESCIPTION_ABUSE_PAST_12_MONTHS: NO

## 2024-04-07 ASSESSMENT — COLUMBIA-SUICIDE SEVERITY RATING SCALE - C-SSRS
5. HAVE YOU STARTED TO WORK OUT OR WORKED OUT THE DETAILS OF HOW TO KILL YOURSELF? DO YOU INTEND TO CARRY OUT THIS PLAN?: YES
2. HAVE YOU ACTUALLY HAD ANY THOUGHTS OF KILLING YOURSELF?: YES
6. HAVE YOU EVER DONE ANYTHING, STARTED TO DO ANYTHING, OR PREPARED TO DO ANYTHING TO END YOUR LIFE?: NO
1. SINCE LAST CONTACT, HAVE YOU WISHED YOU WERE DEAD OR WISHED YOU COULD GO TO SLEEP AND NOT WAKE UP?: YES

## 2024-04-07 ASSESSMENT — PAIN SCALES - GENERAL
PAINLEVEL_OUTOF10: 0 - NO PAIN
PAINLEVEL_OUTOF10: 0 - NO PAIN

## 2024-04-07 ASSESSMENT — PAIN - FUNCTIONAL ASSESSMENT: PAIN_FUNCTIONAL_ASSESSMENT: 0-10
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
## 2024-04-07 NOTE — SIGNIFICANT EVENT
Application for Emergency Admission      Ready for Transfer?  Is the patient medically cleared for transfer to inpatient psychiatry: Yes  Has the patient been accepted to an inpatient psychiatric hospital: Yes    Application for Emergency Admission  IN ACCORDANCE WITH SE[place] 5122[place]10 [place]  The Chief Clinical Officer of: Jaime Martinez 4/7/2024 .3:04 PM    Reason for Hospitalization  The undersigned has reason to believe that: Julieth Rodrigues Is a mentally ill person subject to hospitalization by court order under division B Section 5122.01 of the Revised Code, i.e., this person:    1.Yes  Represents a substantial risk of physical harm to self as manifested by evidence of threats of, or attempts at, suicide or serious self-inflicted bodily harm    2.No Represents a substantial risk of physical harm to others as manifested by evidence of recent homicidal or other violent behavior, evidence of recent threats that place another in reasonable fear of violent behavior and serious physical harm, or other evidence of present dangerousness    3.No Represents a substantial and immediate risk of serious physical impairment or injury to self as manifested by  evidence that the person is unable to provide for and is not providing for the person's basic physical needs because of the person's mental illness and that appropriate provision for those needs cannot be made  immediately available in the community    4.Yes Would benefit from treatment in a hospital for his mental illness and is in need of such treatment as manifested by evidence of behavior that creates a grave and imminent risk to substantial rights of others or  himself.    5.Yes Would benefit from treatment as manifested by evidence of behavior that indicates all of the following:       (a) The person is unlikely to survive safely in the community without supervision, based on a clinical determination.       (b) The person has a history of lack of compliance  with treatment for mental illness and one of the following applies:      (i) At least twice within the thirty-six months prior to the filing of an affidavit seeking court-ordered treatment of the person under section 5122.111 of the Revised Code, the lack of compliance has been a significant factor in necessitating hospitalization in a hospital or receipt of services in a forensic or other mental health unit of a correctional facility, provided that the thirty-six-month period shall be extended by the length of any hospitalization or incarceration of the person that occurred within the thirty-six-month period.      (ii) Within the forty-eight months prior to the filing of an affidavit seeking court-ordered treatment of the person under section 5122.111 of the Revised Code, the lack of compliance resulted in one or more acts of serious violent behavior toward self or others or threats of, or attempts at, serious physical harm to self or others, provided that the forty-eight-month period shall be extended by the length of any hospitalization or incarceration of the person that occurred within the forty-eight-month period.      (c) The person, as a result of mental illness, is unlikely to voluntarily participate in necessary treatment.       (d) In view of the person's treatment history and current behavior, the person is in need of treatment in order to prevent a relapse or deterioration that would be likely to result in substantial risk of serious harm to the person or others.    (e) Represents a substantial risk of physical harm to self or others if allowed to remain at liberty pending examination.    Therefore, it is requested that said person be admitted to the above named facility.    STATEMENT OF BELIEF    Must be filled out by one of the following: a psychiatrist, licensed physician, licensed clinical psychologist, health or ,  or .  (Statement shall include the circumstances  under which the individual was taken into custody and the reason for the person's belief that hospitalization is necessary. The statement shall also include a reference to efforts made to secure the individual's property at his residence if he was taken into custody there. Every reasonable and appropriate effort should be made to take this person into custody in the least conspicuous manner possible.)    Patient expresses suicidal ideation.  She has been medically cleared by my partner and is stable for transfer for inpatient psychiatric care    Karlos Petit MD 4/7/2024     _____________________________________________________________   Place of Employment: [facility] emergency department    STATEMENT OF OBSERVATION BY PSYCHIATRIST, LICENSED PHYSICIAN, OR LICENSED CLINICAL PSYCHOLOGIST, IF APPLICABLE    Place of Observation (e.g., [facility] mental health center, general hospital, office, emergency facility)  (If applicable, please complete)    Karlos Petit MD 4/7/2024    _____________________________________________________________
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
## 2024-04-09 PROBLEM — R45.851 SUICIDAL IDEATION: Status: ACTIVE | Noted: 2024-04-09

## 2024-04-09 PROBLEM — F32.0 CURRENT MILD EPISODE OF MAJOR DEPRESSIVE DISORDER (CMS-HCC): Status: ACTIVE | Noted: 2024-04-09

## 2024-05-23 ENCOUNTER — HOSPITAL ENCOUNTER (EMERGENCY)
Age: 24
Discharge: PSYCHIATRIC OR SUBSTANCE USE FACILITY | DRG: 751 | End: 2024-05-23
Attending: EMERGENCY MEDICINE
Payer: COMMERCIAL

## 2024-05-23 ENCOUNTER — HOSPITAL ENCOUNTER (INPATIENT)
Age: 24
LOS: 4 days | Discharge: HOME | DRG: 751 | End: 2024-05-27
Attending: PSYCHIATRY & NEUROLOGY | Admitting: PSYCHIATRY & NEUROLOGY
Payer: COMMERCIAL

## 2024-05-23 VITALS
TEMPERATURE: 97.3 F | HEIGHT: 64 IN | RESPIRATION RATE: 14 BRPM | HEART RATE: 75 BPM | DIASTOLIC BLOOD PRESSURE: 61 MMHG | WEIGHT: 214.51 LBS | OXYGEN SATURATION: 100 % | BODY MASS INDEX: 36.62 KG/M2 | SYSTOLIC BLOOD PRESSURE: 115 MMHG

## 2024-05-23 DIAGNOSIS — R45.851 SUICIDAL IDEATION: Primary | ICD-10-CM

## 2024-05-23 DIAGNOSIS — N92.0 MENORRHAGIA WITH REGULAR CYCLE: Primary | ICD-10-CM

## 2024-05-23 PROBLEM — F32.A DEPRESSION WITH SUICIDAL IDEATION: Status: ACTIVE | Noted: 2024-05-23

## 2024-05-23 PROBLEM — F33.2 MAJOR DEPRESSIVE DISORDER, RECURRENT, SEVERE WITHOUT PSYCHOTIC BEHAVIOR (HCC): Status: ACTIVE | Noted: 2024-05-23

## 2024-05-23 LAB
ALBUMIN SERPL-MCNC: 4.2 G/DL (ref 3.5–5.2)
ALBUMIN/GLOB SERPL: 1 {RATIO} (ref 1–2.5)
ALP SERPL-CCNC: 84 U/L (ref 35–104)
ALT SERPL-CCNC: 9 U/L (ref 10–35)
AMPHET UR QL SCN: POSITIVE
ANION GAP SERPL CALCULATED.3IONS-SCNC: 11 MMOL/L (ref 9–16)
APAP SERPL-MCNC: <5 UG/ML (ref 10–30)
AST SERPL-CCNC: 26 U/L (ref 10–35)
BARBITURATES UR QL SCN: NEGATIVE
BASOPHILS # BLD: 0.03 K/UL (ref 0–0.2)
BASOPHILS NFR BLD: 1 % (ref 0–2)
BENZODIAZ UR QL: NEGATIVE
BILIRUB SERPL-MCNC: 0.5 MG/DL (ref 0–1.2)
BUN SERPL-MCNC: 12 MG/DL (ref 6–20)
CALCIUM SERPL-MCNC: 9 MG/DL (ref 8.6–10.4)
CANNABINOIDS UR QL SCN: POSITIVE
CHLORIDE SERPL-SCNC: 105 MMOL/L (ref 98–107)
CO2 SERPL-SCNC: 23 MMOL/L (ref 20–31)
COCAINE UR QL SCN: NEGATIVE
CREAT SERPL-MCNC: 0.6 MG/DL (ref 0.5–0.9)
EOSINOPHIL # BLD: 0.14 K/UL (ref 0–0.44)
EOSINOPHILS RELATIVE PERCENT: 2 % (ref 1–4)
ERYTHROCYTE [DISTWIDTH] IN BLOOD BY AUTOMATED COUNT: 14.6 % (ref 11.8–14.4)
ETHANOL PERCENT: <0.01 %
ETHANOLAMINE SERPL-MCNC: <10 MG/DL (ref 0–0.08)
FENTANYL UR QL: NEGATIVE
GFR, ESTIMATED: >90 ML/MIN/1.73M2
GLUCOSE SERPL-MCNC: 86 MG/DL (ref 74–99)
HCG SERPL QL: NEGATIVE
HCT VFR BLD AUTO: 34.2 % (ref 36.3–47.1)
HGB BLD-MCNC: 11 G/DL (ref 11.9–15.1)
IMM GRANULOCYTES # BLD AUTO: <0.03 K/UL (ref 0–0.3)
IMM GRANULOCYTES NFR BLD: 0 %
LYMPHOCYTES NFR BLD: 1.13 K/UL (ref 1.1–3.7)
LYMPHOCYTES RELATIVE PERCENT: 17 % (ref 25–45)
MCH RBC QN AUTO: 25 PG (ref 25.2–33.5)
MCHC RBC AUTO-ENTMCNC: 32.2 G/DL (ref 28.4–34.8)
MCV RBC AUTO: 77.7 FL (ref 82.6–102.9)
METHADONE UR QL: NEGATIVE
MONOCYTES NFR BLD: 0.57 K/UL (ref 0.1–1.4)
MONOCYTES NFR BLD: 9 % (ref 2–8)
NEUTROPHILS NFR BLD: 71 % (ref 34–64)
NEUTS SEG NFR BLD: 4.7 K/UL (ref 1.5–8.1)
NRBC BLD-RTO: 0 PER 100 WBC
OPIATES UR QL SCN: POSITIVE
OXYCODONE UR QL SCN: NEGATIVE
PCP UR QL SCN: NEGATIVE
PLATELET # BLD AUTO: 303 K/UL (ref 138–453)
PMV BLD AUTO: 10 FL (ref 8.1–13.5)
POTASSIUM SERPL-SCNC: 3.8 MMOL/L (ref 3.7–5.3)
PROT SERPL-MCNC: 7.9 G/DL (ref 6.6–8.7)
RBC # BLD AUTO: 4.4 M/UL (ref 3.95–5.11)
RBC # BLD: ABNORMAL 10*6/UL
SALICYLATES SERPL-MCNC: <0.5 MG/DL (ref 0–10)
SODIUM SERPL-SCNC: 139 MMOL/L (ref 136–145)
TEST INFORMATION: ABNORMAL
WBC OTHER # BLD: 6.6 K/UL (ref 4.5–13.5)

## 2024-05-23 PROCEDURE — 80143 DRUG ASSAY ACETAMINOPHEN: CPT

## 2024-05-23 PROCEDURE — 99232 SBSQ HOSP IP/OBS MODERATE 35: CPT | Performed by: PSYCHIATRY & NEUROLOGY

## 2024-05-23 PROCEDURE — 84703 CHORIONIC GONADOTROPIN ASSAY: CPT

## 2024-05-23 PROCEDURE — 99222 1ST HOSP IP/OBS MODERATE 55: CPT | Performed by: INTERNAL MEDICINE

## 2024-05-23 PROCEDURE — 6370000000 HC RX 637 (ALT 250 FOR IP): Performed by: PSYCHIATRY & NEUROLOGY

## 2024-05-23 PROCEDURE — 80053 COMPREHEN METABOLIC PANEL: CPT

## 2024-05-23 PROCEDURE — 80179 DRUG ASSAY SALICYLATE: CPT

## 2024-05-23 PROCEDURE — 85025 COMPLETE CBC W/AUTO DIFF WBC: CPT

## 2024-05-23 PROCEDURE — G0480 DRUG TEST DEF 1-7 CLASSES: HCPCS

## 2024-05-23 PROCEDURE — 1240000000 HC EMOTIONAL WELLNESS R&B

## 2024-05-23 PROCEDURE — 99285 EMERGENCY DEPT VISIT HI MDM: CPT

## 2024-05-23 PROCEDURE — 80307 DRUG TEST PRSMV CHEM ANLYZR: CPT

## 2024-05-23 PROCEDURE — APPSS60 APP SPLIT SHARED TIME 46-60 MINUTES: Performed by: NURSE PRACTITIONER

## 2024-05-23 RX ORDER — IBUPROFEN 400 MG/1
400 TABLET ORAL EVERY 6 HOURS PRN
Status: DISCONTINUED | OUTPATIENT
Start: 2024-05-23 | End: 2024-05-27 | Stop reason: HOSPADM

## 2024-05-23 RX ORDER — LORAZEPAM 1 MG/1
2 TABLET ORAL EVERY 6 HOURS PRN
Status: DISCONTINUED | OUTPATIENT
Start: 2024-05-23 | End: 2024-05-27 | Stop reason: HOSPADM

## 2024-05-23 RX ORDER — MAGNESIUM HYDROXIDE/ALUMINUM HYDROXICE/SIMETHICONE 120; 1200; 1200 MG/30ML; MG/30ML; MG/30ML
30 SUSPENSION ORAL EVERY 6 HOURS PRN
Status: DISCONTINUED | OUTPATIENT
Start: 2024-05-23 | End: 2024-05-27 | Stop reason: HOSPADM

## 2024-05-23 RX ORDER — DIPHENHYDRAMINE HYDROCHLORIDE 50 MG/ML
50 INJECTION INTRAMUSCULAR; INTRAVENOUS EVERY 6 HOURS PRN
Status: DISCONTINUED | OUTPATIENT
Start: 2024-05-23 | End: 2024-05-27 | Stop reason: HOSPADM

## 2024-05-23 RX ORDER — HYDROXYZINE 50 MG/1
50 TABLET, FILM COATED ORAL 3 TIMES DAILY PRN
Status: DISCONTINUED | OUTPATIENT
Start: 2024-05-23 | End: 2024-05-27 | Stop reason: HOSPADM

## 2024-05-23 RX ORDER — HALOPERIDOL 5 MG/ML
5 INJECTION INTRAMUSCULAR EVERY 6 HOURS PRN
Status: DISCONTINUED | OUTPATIENT
Start: 2024-05-23 | End: 2024-05-27 | Stop reason: HOSPADM

## 2024-05-23 RX ORDER — ACETAMINOPHEN 325 MG/1
650 TABLET ORAL EVERY 6 HOURS PRN
Status: DISCONTINUED | OUTPATIENT
Start: 2024-05-23 | End: 2024-05-27 | Stop reason: HOSPADM

## 2024-05-23 RX ORDER — LORAZEPAM 2 MG/ML
2 INJECTION INTRAMUSCULAR EVERY 6 HOURS PRN
Status: DISCONTINUED | OUTPATIENT
Start: 2024-05-23 | End: 2024-05-27 | Stop reason: HOSPADM

## 2024-05-23 RX ORDER — ARIPIPRAZOLE 10 MG/1
10 TABLET ORAL DAILY
Status: DISCONTINUED | OUTPATIENT
Start: 2024-05-24 | End: 2024-05-27 | Stop reason: HOSPADM

## 2024-05-23 RX ORDER — HALOPERIDOL 5 MG/1
5 TABLET ORAL EVERY 6 HOURS PRN
Status: DISCONTINUED | OUTPATIENT
Start: 2024-05-23 | End: 2024-05-27 | Stop reason: HOSPADM

## 2024-05-23 RX ORDER — TRAZODONE HYDROCHLORIDE 50 MG/1
50 TABLET ORAL NIGHTLY PRN
Status: DISCONTINUED | OUTPATIENT
Start: 2024-05-23 | End: 2024-05-27 | Stop reason: HOSPADM

## 2024-05-23 RX ORDER — POLYETHYLENE GLYCOL 3350 17 G
2 POWDER IN PACKET (EA) ORAL
Status: DISCONTINUED | OUTPATIENT
Start: 2024-05-23 | End: 2024-05-27 | Stop reason: HOSPADM

## 2024-05-23 RX ORDER — HYDROXYZINE PAMOATE 25 MG/1
25 CAPSULE ORAL 2 TIMES DAILY PRN
Status: ON HOLD | COMMUNITY
Start: 2024-01-25 | End: 2024-05-27 | Stop reason: HOSPADM

## 2024-05-23 RX ORDER — POLYETHYLENE GLYCOL 3350 17 G/17G
17 POWDER, FOR SOLUTION ORAL DAILY PRN
Status: DISCONTINUED | OUTPATIENT
Start: 2024-05-23 | End: 2024-05-27 | Stop reason: HOSPADM

## 2024-05-23 RX ADMIN — HYDROXYZINE HYDROCHLORIDE 50 MG: 50 TABLET, FILM COATED ORAL at 17:47

## 2024-05-23 ASSESSMENT — ENCOUNTER SYMPTOMS
COLOR CHANGE: 0
VOMITING: 0
DIARRHEA: 0
RHINORRHEA: 0
NAUSEA: 0
SHORTNESS OF BREATH: 0
ABDOMINAL PAIN: 0

## 2024-05-23 ASSESSMENT — PATIENT HEALTH QUESTIONNAIRE - PHQ9
6. FEELING BAD ABOUT YOURSELF - OR THAT YOU ARE A FAILURE OR HAVE LET YOURSELF OR YOUR FAMILY DOWN: MORE THAN HALF THE DAYS
8. MOVING OR SPEAKING SO SLOWLY THAT OTHER PEOPLE COULD HAVE NOTICED. OR THE OPPOSITE, BEING SO FIGETY OR RESTLESS THAT YOU HAVE BEEN MOVING AROUND A LOT MORE THAN USUAL: MORE THAN HALF THE DAYS
2. FEELING DOWN, DEPRESSED OR HOPELESS: MORE THAN HALF THE DAYS
4. FEELING TIRED OR HAVING LITTLE ENERGY: SEVERAL DAYS
SUM OF ALL RESPONSES TO PHQ QUESTIONS 1-9: 14
SUM OF ALL RESPONSES TO PHQ QUESTIONS 1-9: 12
5. POOR APPETITE OR OVEREATING: SEVERAL DAYS
7. TROUBLE CONCENTRATING ON THINGS, SUCH AS READING THE NEWSPAPER OR WATCHING TELEVISION: MORE THAN HALF THE DAYS
1. LITTLE INTEREST OR PLEASURE IN DOING THINGS: SEVERAL DAYS
SUM OF ALL RESPONSES TO PHQ QUESTIONS 1-9: 14
SUM OF ALL RESPONSES TO PHQ9 QUESTIONS 1 & 2: 3
9. THOUGHTS THAT YOU WOULD BE BETTER OFF DEAD, OR OF HURTING YOURSELF: MORE THAN HALF THE DAYS
3. TROUBLE FALLING OR STAYING ASLEEP: SEVERAL DAYS
10. IF YOU CHECKED OFF ANY PROBLEMS, HOW DIFFICULT HAVE THESE PROBLEMS MADE IT FOR YOU TO DO YOUR WORK, TAKE CARE OF THINGS AT HOME, OR GET ALONG WITH OTHER PEOPLE: VERY DIFFICULT
SUM OF ALL RESPONSES TO PHQ QUESTIONS 1-9: 14

## 2024-05-23 ASSESSMENT — LIFESTYLE VARIABLES
HOW MANY STANDARD DRINKS CONTAINING ALCOHOL DO YOU HAVE ON A TYPICAL DAY: 3 OR 4
HOW OFTEN DO YOU HAVE A DRINK CONTAINING ALCOHOL: MONTHLY OR LESS
HOW MANY STANDARD DRINKS CONTAINING ALCOHOL DO YOU HAVE ON A TYPICAL DAY: PATIENT DECLINED
HOW MANY STANDARD DRINKS CONTAINING ALCOHOL DO YOU HAVE ON A TYPICAL DAY: 3 OR 4
HOW OFTEN DO YOU HAVE A DRINK CONTAINING ALCOHOL: 2-3 TIMES A WEEK
HOW OFTEN DO YOU HAVE A DRINK CONTAINING ALCOHOL: 2-4 TIMES A MONTH

## 2024-05-23 ASSESSMENT — PAIN - FUNCTIONAL ASSESSMENT: PAIN_FUNCTIONAL_ASSESSMENT: NONE - DENIES PAIN

## 2024-05-23 ASSESSMENT — SLEEP AND FATIGUE QUESTIONNAIRES
DO YOU HAVE DIFFICULTY SLEEPING: NO
AVERAGE NUMBER OF SLEEP HOURS: 7
DO YOU USE A SLEEP AID: NO

## 2024-05-23 ASSESSMENT — PAIN SCALES - GENERAL: PAINLEVEL_OUTOF10: 0

## 2024-05-23 NOTE — ED NOTES
Pt resting in bed, NAD noted rr even and non labored.  Bed locked in lowest position, environment free of clutter.  Call light within reach, will continue to monitor.   1:1 observer at bedside   Patient offered food, and is eating

## 2024-05-23 NOTE — PROGRESS NOTES
Behavioral Services  Medicare Certification Upon Admission    I certify that this patient's inpatient psychiatric hospital admission is medically necessary for:    [x] (1) Treatment which could reasonably be expected to improve this patient's condition,       [x] (2) Or for diagnostic study;     AND     [x](2) The inpatient psychiatric services are provided while the individual is under the care of a physician and are included in the individualized plan of care.    Estimated length of stay/service 2-9 days    Plan for post-hospital care -outpatient care    Electronically signed by CHIARA NELSON MD on 5/23/2024 at 7:53 PM

## 2024-05-23 NOTE — ED NOTES
Pt resting in bed, NAD noted rr even and non labored.  Bed locked in lowest position, environment free of clutter.  Call light within reach, will continue to monitor.   1:1 observer at bedside

## 2024-05-23 NOTE — ED PROVIDER NOTES
[facility] ED  eMERGENCY dEPARTMENT eNCOUnter   Attending Attestation     Pt Name: Fransisca Galindo  MRN: [id number]  Birthdate 2000  Date of evaluation: 5/23/24       Fransisca Galindo is a 23 y.o. female who presents with Suicidal (Having thoughts for a couple weeks. No plan.  Thoughts of hurting her family as well. )      8:31 AM EDT      History: Patient presents with suicidal thoughts.  Patient said this been going on for a couple of weeks.  Patient says she is feeling homicidal 2.  Patient said has been going on for couple of days.  Patient says she wants her family but does have any plan to hurt herself or her family.  Patient says she been using drugs she has been considering overdosing on the pills she takes recreationally including ecstasy.  Patient also says she was drinking.  Patient says she hopes to OD on ecstasy pills and alcohol.    Exam: Heart rate and rhythm are regular.  Lungs are clear bilaterally.  Abdomen is soft, nontender.  Patient states that she is feeling suicidal and homicidal.    Patient with vague plan.  Will get labs, will involve social work, consider transfer to behavioral health [facility].    I performed a history and physical examination of the patient and discussed management with the resident. I reviewed the resident’s note and agree with the documented findings and plan of care. Any areas of disagreement are noted on the chart. I was personally present for the key portions of any procedures. I have documented in the chart those procedures where I was not present during the key portions. I have personally reviewed all images and agree with the resident's interpretation. I have reviewed the emergency nurses triage note. I agree with the chief complaint, past medical history, past surgical history, allergies, medications, social and family history as documented unless otherwise noted below. Documentation of the HPI, Physical Exam and Medical Decision Making performed by  medical students or scribes is based on my personal performance of the HPI, PE and MDM. For Phys Assistant/ Nurse Practitioner cases/documentation I have had a face to face evaluation of this patient and have completed at least one if not all key elements of the E/M (history, physical exam, and MDM). Additional findings are as noted.    For APC cases I have personally evaluated and examined the patient in conjunction with the APC and agree with the treatment plan and disposition of the patient as recorded by the APC.    Leon Pompa MD  Attending Emergency  Physician       Leon Pompa MD  05/23/24 3034

## 2024-05-23 NOTE — ED PROVIDER NOTES
[facility] ED  Emergency Department Encounter  Emergency Medicine Resident     Pt Name:Fransisca Galindo  MRN: [id number]  Birthdate 2000  Date of evaluation: 5/23/24  PCP:  Adriana Ortega  Note Started: 7:48 AM EDT      CHIEF COMPLAINT       Chief Complaint   Patient presents with    Suicidal     Having thoughts for a couple weeks. No plan.  Thoughts of hurting her family as well.        HISTORY OF PRESENT ILLNESS  (Location/Symptom, Timing/Onset, Context/Setting, Quality, Duration, Modifying Factors, Severity.)      Fransisca Galindo is a 23 y.o. female who presents with suicidal ideations.  Patient states she plans to kill herself with overdose.  This been hospitalized for suicidal ideations before in the past.  Is a history of major depression and anxiety.  Patient also states that she would like to harm her family members but is not sent anyone to do this.  Denies any auditory or visual hallucinations.  States she was drinking this week and did take some ecstasy but that is 2 days ago denying any medical complaints at this time including chest pain, shortness breath, abdominal pain, fever, chills    PAST MEDICAL / SURGICAL / SOCIAL / FAMILY HISTORY      has a past medical history of Anxiety and Depression.       has a past surgical history that includes Cholecystectomy (2021); Upper gastrointestinal endoscopy; and Tympanostomy tube placement.      Social History     Socioeconomic History    Marital status: Single     Spouse name: Not on file    Number of children: Not on file    Years of education: Not on file    Highest education level: Not on file   Occupational History    Not on file   Tobacco Use    Smoking status: Some Days     Types: Cigarettes    Smokeless tobacco: Never   Vaping Use    Vaping Use: Every day    Substances: Nicotine    Devices: Disposable   Substance and Sexual Activity    Alcohol use: Not Currently    Drug use: Yes     Types: Marijuana (Weed), Methamphetamines (Crystal Meth)
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
## 2024-05-23 NOTE — ED NOTES
[facility]acon Mathur transportation here fto transport to [facility]   Security called , personal belongings were released, given to the transporters  Large bag/purse, (was not searched so not sure what items are in there) and pants, shirt and shoes were all sent with the patient

## 2024-05-23 NOTE — BH NOTE
Patient arrived on the unit via stretcher and accompanied by 2 staff from [facility] transport.

## 2024-05-23 NOTE — CARE COORDINATION
BHI Biopsychosocial Assessment    Current Level of Psychosocial Functioning     Independent xxx  Dependent    Minimal Assist     Comments:    Psychosocial High Risk Factors (check all that apply)    Unable to obtain meds   Chronic illness/pain    Substance abuse   Lack of Family Support   Financial stress   Isolation   Inadequate Community Resources  Suicide attempt(s)  Not taking medications   Victim of crime   Developmental Delay  Unable to manage personal needs    Age 65 or older   Homeless  No transportation   Readmission within 30 days  Unemployment  Traumatic Event    Comments:   Psychiatric Advanced Directives: none reported     Family to Involve in Treatment: she denies having family support, states she has a supportive boyfriend but will not discuss their relationship     Sexual Orientation:  n/a    Patient Strengths: expresses interest in pursuing AOD/recovery treatment at discharge     Patient Barriers: currently homeless (has been in the [facility] for one month \"after I came here for a hookup.\"), has no income, reports polysubstance use       Opiate Education Provided:  reports opiate and ectasy use, denies history of treatment, AOD resource folder given and discussed       CMHC/mental health history: reports history of link to outpatient services in the [facility], could not remember name of agency     Plan of Care   medication management, group/individual therapies, family meetings, psycho -education, treatment team meetings to assist with stabilization    Initial Discharge Plan:  AOD/recovery folder given       Clinical Summary:  Fransisca is a 23 year old single female who has been admitted to [facility] with report of suicidal ideations. Fransisca states she has been in the [place] area for one month, is initially guarded and refuses assessment questions, but expands a little after support offered. She states she was raised by her biological parents until age 17, then aged out of foster

## 2024-05-23 NOTE — BH NOTE
Provider notified of suicide BPA and pt's current mental status.  Provider declined 1:1 and ordered patient monitoring Q15 minutes.

## 2024-05-23 NOTE — ED NOTES
Patient will go to the [facility] Unit, room #120, at 1pm by Deacon Garcia.  RN has transfer packet and will call report.  Patient updated.  JENNIFER Bar

## 2024-05-23 NOTE — ED NOTES
[] [facility]    [] [facility]    [x]  [facility]    SUICIDE RISK ASSESSMENT      Y  N     [x] [] In the past two weeks have you had thoughts of hurting yourself in any way?    [x] [] In the past two weeks have you had thoughts that you would be better off dead?   [x] [] Have you made a suicide attempt in the past two months?   [x] [] Do you have a plan for hurting yourself or suicide?   [] [x] Presence of hallucinations/voices related to hurting himself or herself or someone else.    SUICIDE/SECURITY WATCH PRECAUTION CHECKLIST     Orders    [x]  Suicide/Security Watch Precautions initiated as checked below:   5/23/24 8:25 AM EDT 43/43    [x] Notified physician:  Leon Pompa MD  5/23/24 8:25 AM EDT    [x] Orders obtained as appropriate:     [x] 1:1 Observer     [] Psych Consult     [] Psych Consult    Name:  Date:  Time:    [x] 1:1 Observer, Notified by:  DULCE STOREY RN    Contact Nurse Supervisor    [x] Remove all personal clothes from room and place in snap/paper gown/pants.  Slipper only    [x] Remove all personal belongings from room and secured away from patient.    Documentation    [x] Initiate Suicide/Security Watch Precaution Flow Sheet    [x] Initiate individualized Care Plan/Problem    [x] Document why precautions initiated on flow sheet (Initiate Nursing Care Plan/Problem)    [x] 1:1 Observer in place; instructions provided.  Suicide precautions require observer be within arms length.    [x] Nurse-Observer Communication Hand-off initiated by RN, reviewed with Observer.  Subsequently used as Hand Off between Observers.    [x] Initiate every 15 minute observations per observer as delegated by the RN.    [x] Initiate RN assessment and documentation    Environmental Scan  Search Criteria and Process: OPTIONAL, see Search Policy    [] Reason for search:    [] Nursing in presence of second person to search patient    [] Patient notified of reason for body assessment and belongings  Factors: (related to)  [x] Expressed or implied suicidal ideation/behavior  [x] Depression  [x] Suicide attempt      [x] Low self-esteem  [] Hallucinations      [] Feeling of Hopelessness  [x] Substance abuse or withdrawal    [x] Dysfunctional family  [] Major traumatic event, eg., divorce, etc   [] Excessive stress/anxiety    5/23/24    Expected Outcomes    Patient will:   [x] Patient will remain safe for the duration of their stay   [x] Patient's environment will be safe, eg. Free of potential suicide weapons   [] Verbalize Recovery from suicidal episode and improvement in self-worth   [x] Discuss feeling that precipitated suicide attempt/thoughts/behavior   [x] Will describe available resources for crisis prevention and management   [x] Will verbalize positive coping skills     Nursing Intervention   [x] Assessment and Observations hourly   [x] Suicide Precautions implemented with patient, should be 1:1 observation   [x] Document observation [id number] and RN assessment hourly   [] Consult physician for:    [] Psychiatric consult    [] Pharmacological therapy    [] Other:    [x] Patient search completed by security   [x] Initiated appropriate safety protocols by removing from the patient's environment anything that could be used to inflict self injury, eg. Order safe tray, snap gown, etc   [x] Maintain open, warm, caring, non-judgmental attitude/manner towards patient   [] Discuss advantages and disadvantages of existing coping methods/skills   [x] Assist and educate patient with identifying present strengths and coping skills   [x] Keep patient informed regarding plan of care and provide clear concise explanations.  Provide the patient/family education information as well as telephone numbers and other information about crisis centers, hot lines, and counselors.    Discharge Planning:   [] Referral  [] Groups [] Health agencies  [x] Other:

## 2024-05-23 NOTE — ED NOTES
5/23/2024     7:42 AM   C-SSRS Suicide Screening   1) Within the past month, have you wished you were dead or wished you could go to sleep and not wake up?  Yes   2) Have you actually had any thoughts of killing yourself?  Yes   3) Have you been thinking about how you might kill yourself?  No   4) Have you had these thoughts and had some intention of acting on them?  Yes   5) Have you started to work out or worked out the details of how to kill yourself? Do you intend to carry out this plan?  No   6) Have you ever done anything, started to do anything, or prepared to do anything to end your life? Yes   Did this occur within the past 3 months?  Yes

## 2024-05-23 NOTE — ED NOTES
Per Resident, Dr. Clark, patient accepted to the [facility].  Voluntary faxed, await bed placement.  EMTALA received and completed.  JENNIFER Bar

## 2024-05-23 NOTE — BH NOTE
[facility]  Admission Note     Admission Type:   Admission Type: Voluntary    Reason for admission:  Reason for Admission: Patient reports family told her \"You will never see your son again\" Patient reports this upset her and caused her to have increase in suicidal thoughts and thoughts to harm family. She is currently living at the [facility] after moving her for a ewa she met.      Addictive Behavior:   Addictive Behavior  In the Past 3 Months, Have You Felt or Has Someone Told You That You Have a Problem With  : None    Medical Problems:   Past Medical History:   Diagnosis Date    Anxiety     Depression        Status EXAM:  Mental Status and Behavioral Exam  Normal: No  Level of Assistance: Independent/Self  Facial Expression: Avoids Gaze, Flat  Affect: Blunt  Level of Consciousness: Alert  Frequency of Checks: 4 times per hour, close  Mood:Normal: No  Mood: Depressed, Anxious, Helpless  Motor Activity:Normal: No  Motor Activity: Decreased  Eye Contact: Fair  Observed Behavior: Guarded, Cooperative  Sexual Misconduct History: Current - no  Preception: [place] to person, [place] to time, [place] to place, [place] to situation  Attention:Normal: No  Attention: Distractible  Thought Processes: Blocking  Thought Content:Normal: No  Thought Content: Preoccupations  Depression Symptoms: Change in energy level, Feelings of helplessness, Feelings of worthlessness, Loss of interest  Anxiety Symptoms: Generalized  Pauline Symptoms: No problems reported or observed.  Hallucinations: None  Delusions: No  Memory:Normal: No  Memory: Poor recent  Insight and Judgment: No  Insight and Judgment: Poor judgment, Poor insight    Tobacco Screening:  Practical Counseling, on admission, nicko X, if applicable and completed (first 3 are required if patient doesn't refuse):            ( ) Recognizing danger situations (included triggers and roadblocks)                    ( ) Coping skills (new ways to manage

## 2024-05-23 NOTE — PROGRESS NOTES
Pharmacy Medication History Note      List of current medications patient is taking is complete.    Source of information: [place] ([phone]) spoke with inpatient pharmacy, [facility], PDMP, [facility] dispense report    Changes made to medication list:  Medications removed (include reason, ex. therapy complete or physician discontinued, noncompliance):  none    Medications flagged for provider review:  Aripiprazole - last filled February 2024 but was not continued by providers at [facility] in April 2024    Medications added/doses adjusted:  Added Hydroxyzine 25 mg twice daily as needed    Other notes (ex. Recent course of antibiotics, Coumadin dosing):  Patient was discharged from [facility] in [place], [place] on 4/12/24. Patient was started on Sertraline 50 mg daily.      Current Home Medication List at Time of Admission:  Prior to Admission medications    Medication Sig   hydrOXYzine pamoate (VISTARIL) 25 MG capsule Take 1 capsule by mouth 2 times daily as needed for Anxiety   sertraline (ZOLOFT) 50 MG tablet Take 1 tablet by mouth daily         Please let me know if you have any questions about this encounter. Thank you!    Electronically signed by Miguel Hernandez RPH on 5/23/2024 at 1:23 PM

## 2024-05-23 NOTE — H&P
Department of Psychiatry  Attending Physician Psychiatric Assessment     Reason for Admission to Psychiatric Unit:  Threat to self requiring 24 hour professional observation  Threat to others requiring 24 hour professional observation  A mental disorder causing major disability in social, interpersonal, occupational, and/or educational functioning that is leading to dangerous or life-threatening functioning, and that can only be addressed in an acute inpatient setting   Concerns about patient's safety in the community  Past Mental Health Diagnosis: a history of  Major Depression, Prior suicide attempt, and Alcohol and/or Drug Use Disorder  Triggering event or precipitating factor: Housing instability, Financial instability, Alcohol/Drug Relapse, Relationship Issues, and Psych Treatment Noncompliance  Length of time/duration of triggering event: Weeks  Legal Status: Voluntary    CHIEF COMPLAINT: Depression with suicidal ideation with plan and intent to overdose on fentanyl    History obtained from: Patient, electronic medical record          HISTORY OF PRESENT ILLNESS:    Fransisca Galindo is a 23 y.o. female who has a past medical history of mental illness and polysubstance use. Patient presented to the ED via self reporting suicidal ideation with plan to overdose on fentanyl.  She also expressed vague HI towards family because she feels they do not care about her.  Her sister has custody of her [id number] son.  Patient has been using illicit substances regularly.  She is from the [facility] but came to [place] to be near a man she met recently.  Patient has no income or housing and has been staying at the [facility].  Patient was recently admitted to a psychiatric unit at [facility] in [place], [place].  This is her first admission to [facility].    Patient was seen for initial evaluation today.  Nursing staff reports she has been cooperative and spending most of the day in her room.  Patient was agreeable 
effort  Cardiovascular: normal rate, regular rhythm, no murmur, gallop, rub.  Abdomen: Soft, nontender, nondistended, normal bowel sounds, no hepatomegaly or splenomegaly  Neurologic: There are no new focal motor or sensory deficits, normal muscle tone and bulk, no abnormal sensation, normal speech, cranial nerves II through XII grossly intact  Skin: No gross lesions, rashes, bruising or bleeding on exposed skin area  Extremities:  peripheral pulses palpable, no pedal edema or calf pain with palpation  Psych:     Investigations:      Laboratory Testing:  Recent Results (from the past 24 hour(s))   TOX SCR, BLD, ED    Collection Time: 05/23/24  8:37 AM   Result Value Ref Range    Acetaminophen Level <5 (L) 10 - 30 ug/mL    Ethanol Lvl <10 <10 mg/dL    Ethanol percent <0.010 <0.010 %    Salicylate Lvl <0.5 0.0 - 10.0 mg/dL   CBC with Auto Differential    Collection Time: 05/23/24  8:37 AM   Result Value Ref Range    WBC 6.6 4.5 - 13.5 k/uL    RBC 4.40 3.95 - 5.11 m/uL    Hemoglobin 11.0 (L) 11.9 - 15.1 g/dL    Hematocrit 34.2 (L) 36.3 - 47.1 %    MCV 77.7 (L) 82.6 - 102.9 fL    MCH 25.0 (L) 25.2 - 33.5 pg    MCHC 32.2 28.4 - 34.8 g/dL    RDW 14.6 (H) 11.8 - 14.4 %    Platelets 303 138 - 453 k/uL    MPV 10.0 8.1 - 13.5 fL    NRBC Automated 0.0 0.0 per 100 WBC    Neutrophils % 71 (H) 34 - 64 %    Lymphocytes % 17 (L) 25 - 45 %    Monocytes % 9 (H) 2 - 8 %    Eosinophils % 2 1 - 4 %    Basophils % 1 0 - 2 %    Immature Granulocytes % 0 0 %    Neutrophils Absolute 4.70 1.50 - 8.10 k/uL    Lymphocytes Absolute 1.13 1.10 - 3.70 k/uL    Monocytes Absolute 0.57 0.10 - 1.40 k/uL    Eosinophils Absolute 0.14 0.00 - 0.44 k/uL    Basophils Absolute 0.03 0.00 - 0.20 k/uL    Immature Granulocytes Absolute <0.03 0.00 - 0.30 k/uL    RBC Morphology ANISOCYTOSIS PRESENT MICROCYTOSIS PRESENT    Comprehensive Metabolic Panel    Collection Time: 05/23/24  8:37 AM   Result Value Ref Range    Sodium 139 136 - 145 mmol/L    Potassium 3.8 3.7

## 2024-05-23 NOTE — ED NOTES
The following labs were labeled with appropriate pt sticker and tubed to lab:     [] Blue     [x] Lavender   [] on ice  [x] Green/yellow  [] Green/black [] on ice  [] Yoon  [] on ice  [x] Yellow  [] Red  [] Pink  [] Type/ Screen  [] ABG  [] VBG    [] COVID-19 swab    [] Rapid  [] PCR  [] Flu swab  [] Peds Viral Panel     [x] Urine Sample  [] Fecal Sample  [] Pelvic Cultures  [] Blood Cultures  [] X 2  [] STREP Cultures  [] Wound Cultures

## 2024-05-23 NOTE — ED TRIAGE NOTES
Patient here for suicidal thought and self harm , for the past couple of weeks  Her plan is too \"hopefully overdose on something\":  Patient from [place]   Patient Reports she \"took 2 ecstasy tablets yesterday and drank a bunch of alcohol \"   And also reports she wants to hurt her family, due to them she feels is neglecting her   Reports she ate and drank fluids was yesterday   LMP was may 5., 2024

## 2024-05-24 LAB
IRON SATN MFR SERPL: 8 % (ref 20–55)
IRON SERPL-MCNC: 24 UG/DL (ref 37–145)
TIBC SERPL-MCNC: 304 UG/DL (ref 250–450)
TSH SERPL DL<=0.05 MIU/L-ACNC: 0.88 UIU/ML (ref 0.3–5)
UNSATURATED IRON BINDING CAPACITY: 280 UG/DL (ref 112–347)

## 2024-05-24 PROCEDURE — 6370000000 HC RX 637 (ALT 250 FOR IP): Performed by: INTERNAL MEDICINE

## 2024-05-24 PROCEDURE — 6370000000 HC RX 637 (ALT 250 FOR IP): Performed by: PSYCHIATRY & NEUROLOGY

## 2024-05-24 PROCEDURE — 99232 SBSQ HOSP IP/OBS MODERATE 35: CPT | Performed by: PSYCHIATRY & NEUROLOGY

## 2024-05-24 PROCEDURE — 36415 COLL VENOUS BLD VENIPUNCTURE: CPT

## 2024-05-24 PROCEDURE — 1240000000 HC EMOTIONAL WELLNESS R&B

## 2024-05-24 PROCEDURE — 84443 ASSAY THYROID STIM HORMONE: CPT

## 2024-05-24 PROCEDURE — APPSS30 APP SPLIT SHARED TIME 16-30 MINUTES

## 2024-05-24 PROCEDURE — 83540 ASSAY OF IRON: CPT

## 2024-05-24 PROCEDURE — 83550 IRON BINDING TEST: CPT

## 2024-05-24 RX ORDER — FERROUS SULFATE 325(65) MG
325 TABLET ORAL 2 TIMES DAILY WITH MEALS
Status: DISCONTINUED | OUTPATIENT
Start: 2024-05-24 | End: 2024-05-27 | Stop reason: HOSPADM

## 2024-05-24 RX ADMIN — TRAZODONE HYDROCHLORIDE 50 MG: 50 TABLET ORAL at 20:40

## 2024-05-24 RX ADMIN — FERROUS SULFATE TAB 325 MG (65 MG ELEMENTAL FE) 325 MG: 325 (65 FE) TAB at 17:16

## 2024-05-24 RX ADMIN — SERTRALINE HYDROCHLORIDE 50 MG: 50 TABLET ORAL at 08:08

## 2024-05-24 RX ADMIN — ARIPIPRAZOLE 10 MG: 10 TABLET ORAL at 08:08

## 2024-05-24 NOTE — PROGRESS NOTES
Daily Progress Note  5/24/2024    Patient Name: Fransisca Galindo    CHIEF COMPLAINT:  Depression with suicidal ideation with plan and intent to overdose on fentanyl           SUBJECTIVE:      Patient is seen today for a follow up assessment.  She has been compliant with scheduled medication.  Patient mostly isolated but otherwise in behavioral control and has not required emergency medication last 24 hours.  On approach patient was seen to be laying in bed, blunted, guarded and withdrawn.  She states her mood being \"so-and-so\" and states having \"mixed emotions\".  She reports intermittent thoughts of suicide, denies active plan however states not feeling safe from self out in the community.  Patient appears to minimize events that took place prior to hospitalization.  She states fleeting homicidal ideation towards no one specific and denies any active plan at this time.  She denies any issues with hallucinations and paranoia.  On approach she appears linear, coherent and does not appear to be responding to internal stimuli although some concern of intellectual delay.  She states adequate sleep and oral intake.  Although some signs of improvement patient has yet to demonstrate stability and unable to contract for safety on the community thus requires hospitalization for safety and stabilization.    Appetite:  [x] Adequate/Unchanged  [] Increased  [] Decreased      Sleep:       [x] Adequate/Unchanged  [] Fair  [] Poor      Group Attendance on Unit:   [] Yes   [x] Selectively    [] No    Compliant with scheduled medications: [x] Yes  [] No    Received emergency medications in past 24 hrs: [] Yes   [x] No    Medication Side Effects: Denies         Mental Status Exam  Level of consciousness: Awake and alert  Appearance:  Appropriate attire, resting in bed, poor grooming and hygiene  Behavior/Motor: Approachable, engages with interviewer, restless  Attitude toward examiner:  Cooperative, somewhat attentive, avoids eye

## 2024-05-24 NOTE — GROUP NOTE
Group Therapy Note    Date: 5/24/2024    Group Start Time: 1330  Group End Time: 1425  Group Topic: Cognitive Skills    [facility] Macey Jolly CTRS        Group Therapy Note    Attendees: 4/6       Patient's Goal:  To improve interpersonal skills and decision-making through collaborating with peers and concentrating on a presented task.     Notes:  Patient attended and participated in group. Patient was able to collaborate with peers and concentrate on a presented task. Patient was pleasant and cooperative.     Status After Intervention:  Improved    Participation Level: Active Listener and Interactive    Participation Quality: Appropriate, Attentive, and Supportive      Speech:  normal      Thought Process/Content: Logical and Linear      Affective Functioning: Constricted, brightened       Mood: Dysphoric, brightened       Level of consciousness:  Alert and Attentive      Response to Learning: Able to verbalize current knowledge/experience, Able to verbalize/acknowledge new learning, Able to retain information, and Progressing to goal      Endings: None Reported    Modes of Intervention: Socialization, Exploration, Problem-solving, and Activity      Discipline Responsible: Psychoeducational Specialist      Signature:  SOPHIA Rodriguez

## 2024-05-24 NOTE — GROUP NOTE
Group Therapy Note    Date: 5/24/2024    Group Start Time: 0900  Group End Time: 0930  Group Topic: Group Documentation    [facility] Shae Pitt LPN        Group Therapy Note    Attendees: 3/7       Patient's Goal:  inspire    Notes:  educate    Status After Intervention:  Unchanged    Participation Level: Minimal    Participation Quality: Resistant      Speech:  hesitant      Thought Process/Content: Logical      Affective Functioning: Flat      Mood: depressed      Level of consciousness:  Alert      Response to Learning: Progressing to goal      Endings: None Reported    Modes of Intervention: Education      Discipline Responsible: Licensed Practical Nurse      Signature:  Shae Do LPN

## 2024-05-25 ENCOUNTER — APPOINTMENT (OUTPATIENT)
Dept: ULTRASOUND IMAGING | Age: 24
DRG: 751 | End: 2024-05-25
Attending: PSYCHIATRY & NEUROLOGY
Payer: COMMERCIAL

## 2024-05-25 PROCEDURE — 6370000000 HC RX 637 (ALT 250 FOR IP): Performed by: INTERNAL MEDICINE

## 2024-05-25 PROCEDURE — 99232 SBSQ HOSP IP/OBS MODERATE 35: CPT | Performed by: NURSE PRACTITIONER

## 2024-05-25 PROCEDURE — 6370000000 HC RX 637 (ALT 250 FOR IP): Performed by: PSYCHIATRY & NEUROLOGY

## 2024-05-25 PROCEDURE — 99232 SBSQ HOSP IP/OBS MODERATE 35: CPT | Performed by: INTERNAL MEDICINE

## 2024-05-25 PROCEDURE — 76830 TRANSVAGINAL US NON-OB: CPT

## 2024-05-25 PROCEDURE — 1240000000 HC EMOTIONAL WELLNESS R&B

## 2024-05-25 RX ADMIN — FERROUS SULFATE TAB 325 MG (65 MG ELEMENTAL FE) 325 MG: 325 (65 FE) TAB at 08:25

## 2024-05-25 RX ADMIN — HYDROXYZINE HYDROCHLORIDE 50 MG: 50 TABLET, FILM COATED ORAL at 21:28

## 2024-05-25 RX ADMIN — SERTRALINE HYDROCHLORIDE 50 MG: 50 TABLET ORAL at 08:25

## 2024-05-25 RX ADMIN — TRAZODONE HYDROCHLORIDE 50 MG: 50 TABLET ORAL at 21:28

## 2024-05-25 RX ADMIN — FERROUS SULFATE TAB 325 MG (65 MG ELEMENTAL FE) 325 MG: 325 (65 FE) TAB at 18:14

## 2024-05-25 RX ADMIN — ARIPIPRAZOLE 10 MG: 10 TABLET ORAL at 08:25

## 2024-05-25 NOTE — GROUP NOTE
Group Therapy Note    Date: 5/25/2024    Group Start Time: 1400  Group End Time: 1445  Group Topic: Psychoeducation    [facility] Danita Cruz CTRS    Group Therapy Note    Attendees: 4/7     Patient's Goal:  Patient will identify benefits of music for coping and stress management    Notes:  Patient attended group and participated    Status After Intervention:  Improved    Participation Level: Active Listener and Interactive    Participation Quality: Appropriate, Attentive      Speech:  normal      Thought Process/Content: Logical  Linear      Affective Functioning: Constricted/Restricted      Mood: euthymic      Level of consciousness:  Alert, Oriented x4, and Attentive      Response to Learning: Able to verbalize current knowledge/experience, Able to verbalize/acknowledge new learning, Able to retain information, Able to change behavior, and Progressing to goal      Endings: None Reported    Modes of Intervention: Education, Support, Socialization, and Exploration      Discipline Responsible: Psychoeducational Specialist      Signature:  SOPHIA Dick

## 2024-05-25 NOTE — PROGRESS NOTES
Daily Progress Note  5/25/2024    Patient Name: Fransisca Galindo    CHIEF COMPLAINT:  Depression with suicidal ideation with plan and intent to overdose on fentanyl           SUBJECTIVE:    Fransisca was seen for follow-up assessment today. She has been compliant with scheduled medications and has not required any emergency medications and the past 24 hours. Pelvic ultrasound was unremarkable. Nursing staff report patient has been participating in groups, pleasant, and cooperative today. She was agreeable to conversation in privacy of her room. She presented with brightened affect and noticeably improved grooming and hygiene. Patient expressed feeling better since being restarted on medication. She has also enjoyed the social interaction with her peers. She was not yet able to identify plans post discharge, but was encouraged to consider inpatient AOD treatment. Suicidal and homicidal ideation has decreased. She denied auditory  and visual hallucinations. Although modestly improving, patient is a risk of relapse and decompensation and warrants further hospitalization for safety and stabilization.     Appetite:  [x] Adequate/Unchanged  [] Increased  [] Decreased      Sleep:       [x] Adequate/Unchanged  [] Fair  [] Poor      Group Attendance on Unit:   [] Yes   [x] Selectively    [] No    Compliant with scheduled medications: [x] Yes  [] No    Received emergency medications in past 24 hrs: [] Yes   [x] No    Medication Side Effects: Denies         Mental Status Exam  Level of consciousness: Awake and alert  Appearance:  Appropriate attire, seated on bed, improved grooming and hygiene  Behavior/Motor: Approachable, engages with interviewer, calm  Attitude toward examiner:  Cooperative, attentive, fair eye contact  Speech: Normal rate, volume, and flat tone  Mood: \"a little better\"  Affect: Blunted, improved  Thought processes: Coherent  Thought content: Improving suicidal ideations, without a  current plan or intent,

## 2024-05-25 NOTE — PROGRESS NOTES
IN-PATIENT SERVICE  [facility] Internal Medicine    CONSULTATION / HISTORY AND PHYSICAL EXAMINATION            Date:   5/25/2024  Patient name:  Fransisca Galindo  Date of admission:  5/23/2024 12:48 PM  MRN:   [id number]  Account:  [id number]  YOB: 2000  PCP:    Adriana Ortega  Room:   [address] [place] [address]  Code Status:    Full Code    Physician Requesting Consult: Nish Altman MD    Reason for Consult:  medical management    Chief Complaint:     No chief complaint on file.      History Obtained From:     Patient medical record nursing staff    History of Present Illness:   Patient is 23-year-old female with past medical hist anxiety and depression, and polysubstance abuse is been admitted at [facility] for further management of depression and suicidal ideation.    5/25/2024  Patient reports she gets up units every 30 days, the last 5 to 7 days and she goes through many pads with blood clots and them every day.  She has not seen OB/GYN in the last 10 months, saw them during delivery.    No bleeding elsewhere      Past Medical History:     Past Medical History:   Diagnosis Date    Anxiety     Depression         Past Surgical History:     Past Surgical History:   Procedure Laterality Date    CHOLECYSTECTOMY  2021    TYMPANOSTOMY TUBE PLACEMENT      UPPER GASTROINTESTINAL ENDOSCOPY          Medications Prior to Admission:     Prior to Admission medications    Medication Sig Start Date End Date Taking? Authorizing Provider   hydrOXYzine pamoate (VISTARIL) 25 MG capsule Take 1 capsule by mouth 2 times daily as needed for Anxiety 1/25/24  Yes Provider, MD Aayush   sertraline (ZOLOFT) 50 MG tablet Take 1 tablet by mouth daily 12/15/22   ProviderAayush MD   ARIPiprazole (ABILIFY) 10 MG tablet Take 1 tablet by mouth daily 2/28/24 3/29/24  Gracie Priest, APRN - CNP        Allergies:     Penicillins and Sulfa antibiotics    Social History:     Tobacco:    reports that she has quit smoking. Her

## 2024-05-25 NOTE — GROUP NOTE
Group Therapy Note    Date: 5/25/2024    Group Start Time: 1030  Group End Time: 1120  Group Topic: Psychoeducation    Esperanza Sher, BOBBYW        Group Therapy Note    Attendees: 6/8       patient refused to attend psychoeducation group at 10a after encouragement from staff.  1:1 talk time provided as alternative to group session

## 2024-05-25 NOTE — GROUP NOTE
Group Therapy Note    Date: 5/24/2024    Group Start Time: 2000  Group End Time: 2030  Group Topic: Recreational    [facility] Nikolai Monsivais RN        Group Therapy Note    Attendees: 5           Patient's Goal:  to relax and get ready for bed    Notes:  patient was an active participant    Status After Intervention:  Unchanged    Participation Level: Active Listener and Interactive    Participation Quality: Appropriate, Attentive, Sharing, and Supportive      Speech:  normal      Thought Process/Content: logical      Affective Functioning: brightened      Mood: anxious and depressed      Level of consciousness:  Alert, Oriented x4, and Attentive      Response to Learning: Able to verbalize current knowledge/experience, Able to verbalize/acknowledge new learning, Able to retain information, Capable of insight, Able to change behavior, and Progressing to goal      Endings: None Reported    Modes of Intervention: Socialization, Activity, and Media      Discipline Responsible: Registered Nurse      Signature:  Nikolai Perez RN

## 2024-05-26 PROCEDURE — 6370000000 HC RX 637 (ALT 250 FOR IP): Performed by: PSYCHIATRY & NEUROLOGY

## 2024-05-26 PROCEDURE — 1240000000 HC EMOTIONAL WELLNESS R&B

## 2024-05-26 PROCEDURE — 99232 SBSQ HOSP IP/OBS MODERATE 35: CPT | Performed by: NURSE PRACTITIONER

## 2024-05-26 PROCEDURE — 6370000000 HC RX 637 (ALT 250 FOR IP): Performed by: INTERNAL MEDICINE

## 2024-05-26 RX ADMIN — SERTRALINE HYDROCHLORIDE 50 MG: 50 TABLET ORAL at 08:27

## 2024-05-26 RX ADMIN — FERROUS SULFATE TAB 325 MG (65 MG ELEMENTAL FE) 325 MG: 325 (65 FE) TAB at 08:27

## 2024-05-26 RX ADMIN — HYDROXYZINE HYDROCHLORIDE 50 MG: 50 TABLET, FILM COATED ORAL at 19:31

## 2024-05-26 RX ADMIN — TRAZODONE HYDROCHLORIDE 50 MG: 50 TABLET ORAL at 21:51

## 2024-05-26 RX ADMIN — ARIPIPRAZOLE 10 MG: 10 TABLET ORAL at 08:27

## 2024-05-26 RX ADMIN — ALUMINUM HYDROXIDE, MAGNESIUM HYDROXIDE, AND SIMETHICONE 30 ML: 200; 200; 20 SUSPENSION ORAL at 13:35

## 2024-05-26 ASSESSMENT — PAIN SCALES - GENERAL: PAINLEVEL_OUTOF10: 4

## 2024-05-26 ASSESSMENT — PAIN DESCRIPTION - LOCATION: LOCATION: ABDOMEN

## 2024-05-26 NOTE — GROUP NOTE
Group Therapy Note    Date: 5/26/2024    Group Start Time: 1400  Group End Time: 1500  Group Topic: Psychoeducation    [facility]    Danita Fisher CTRS    Group Therapy Note    Attendees: 9/15     Patient's Goal:  Patient will demonstrate improved interpersonal skills and offer peer support    Notes:  Patient attended group and participated    Status After Intervention:  Improved    Participation Level: Active Listener and Interactive    Participation Quality: Appropriate, Attentive, Sharing, and Supportive      Speech:  normal      Thought Process/Content: Logical  Linear      Affective Functioning: Constricted      Mood: euthymic      Level of consciousness:  Alert, Oriented x4, and Attentive      Response to Learning: Able to verbalize current knowledge/experience, Able to verbalize/acknowledge new learning, Able to retain information      Endings: None Reported    Modes of Intervention: Education, Support, Socialization, and Exploration      Discipline Responsible: Psychoeducational Specialist      Signature:  SOPHIA Dick

## 2024-05-26 NOTE — GROUP NOTE
Group Therapy Note    Date: 5/26/2024    Group Start Time: 1030  Group End Time: 1115  Group Topic: Psychoeducation    [facility] LSW        Group Therapy Note    Attendees: 8/13       Patient's Goal:  automatic thoughts     Notes:      Status After Intervention:  Improved    Participation Level: Active Listener and Interactive    Participation Quality: Appropriate and Attentive      Speech:  normal      Thought Process/Content: Logical      Affective Functioning: Congruent      Mood: euthymic      Level of consciousness:  Alert and Oriented x4      Response to Learning: Able to verbalize current knowledge/experience and Able to verbalize/acknowledge new learning      Endings: None Reported    Modes of Intervention: Education and Support      Discipline Responsible: /Counselor      Signature:  JENNIFER Oliva

## 2024-05-26 NOTE — PROGRESS NOTES
Daily Progress Note  5/26/2024    Patient Name: Fransisca Galindo    CHIEF COMPLAINT:  Depression with suicidal ideation with plan and intent to overdose on fentanyl           SUBJECTIVE:    Fransisca was seen for follow-up assessment today. She has been compliant with scheduled medications and has not required any emergency medications and the past 24 hours.  Nursing staff report she has been social and pleasant and actively engaged in therapeutic groups and milieu activities.  She has presented with brightened affect.  She has not voiced any thoughts of harm to self or others.  She was approached in the day area and was agreeable to conversation in privacy of her room.  Patient reports that she is feeling much better since admission.  Thoughts and speech were logical and linear.  She made no delusional or paranoid statements.  She maintained appropriate eye contact.  Speech patterns are no longer bizarre and she does not speak through clenched teeth.  She feels that the medication has been very beneficial.  She is now denying auditory hallucinations.  She denies suicidal and homicidal ideation.  Writer again approaches the possibility of patient attending AOD treatment inpatient and she hesitates and states that if she does this she would rather do it near her [place] in the [facility].  Patient feels that she is now able to contract for safety in the community.    Appetite:  [x] Adequate/Unchanged  [] Increased  [] Decreased      Sleep:       [x] Adequate/Unchanged  [] Fair  [] Poor      Group Attendance on Unit:   [x] Yes   [] Selectively    [] No    Compliant with scheduled medications: [x] Yes  [] No    Received emergency medications in past 24 hrs: [] Yes   [x] No    Medication Side Effects: Denies         Mental Status Exam  Level of consciousness: Awake and alert  Appearance:  Appropriate attire, seated on bed, improved grooming and hygiene  Behavior/Motor: Approachable, engages with interviewer,

## 2024-05-27 VITALS
BODY MASS INDEX: 34.83 KG/M2 | RESPIRATION RATE: 16 BRPM | HEART RATE: 81 BPM | HEIGHT: 64 IN | SYSTOLIC BLOOD PRESSURE: 108 MMHG | TEMPERATURE: 97.9 F | DIASTOLIC BLOOD PRESSURE: 96 MMHG | WEIGHT: 204 LBS | OXYGEN SATURATION: 95 %

## 2024-05-27 PROCEDURE — 99238 HOSP IP/OBS DSCHRG MGMT 30/<: CPT | Performed by: PSYCHIATRY & NEUROLOGY

## 2024-05-27 PROCEDURE — 6370000000 HC RX 637 (ALT 250 FOR IP): Performed by: PSYCHIATRY & NEUROLOGY

## 2024-05-27 PROCEDURE — 6370000000 HC RX 637 (ALT 250 FOR IP): Performed by: INTERNAL MEDICINE

## 2024-05-27 RX ORDER — FERROUS SULFATE 325(65) MG
325 TABLET ORAL 2 TIMES DAILY WITH MEALS
Qty: 60 TABLET | Refills: 0 | Status: SHIPPED | OUTPATIENT
Start: 2024-05-27

## 2024-05-27 RX ORDER — ARIPIPRAZOLE 10 MG/1
10 TABLET ORAL DAILY
Qty: 30 TABLET | Refills: 0 | Status: SHIPPED | OUTPATIENT
Start: 2024-05-27 | End: 2024-06-26

## 2024-05-27 RX ORDER — HYDROXYZINE 50 MG/1
50 TABLET, FILM COATED ORAL 3 TIMES DAILY PRN
Qty: 30 TABLET | Refills: 0 | Status: SHIPPED | OUTPATIENT
Start: 2024-05-27 | End: 2024-06-06

## 2024-05-27 RX ORDER — ARIPIPRAZOLE 10 MG/1
10 TABLET ORAL DAILY
Qty: 30 TABLET | Refills: 0 | Status: CANCELLED | OUTPATIENT
Start: 2024-05-27 | End: 2024-06-26

## 2024-05-27 RX ORDER — TRAZODONE HYDROCHLORIDE 50 MG/1
50 TABLET ORAL NIGHTLY PRN
Qty: 30 TABLET | Refills: 0 | Status: SHIPPED | OUTPATIENT
Start: 2024-05-27

## 2024-05-27 RX ADMIN — SERTRALINE HYDROCHLORIDE 50 MG: 50 TABLET ORAL at 08:49

## 2024-05-27 RX ADMIN — ARIPIPRAZOLE 10 MG: 10 TABLET ORAL at 08:49

## 2024-05-27 RX ADMIN — FERROUS SULFATE TAB 325 MG (65 MG ELEMENTAL FE) 325 MG: 325 (65 FE) TAB at 08:49

## 2024-05-27 NOTE — DISCHARGE INSTRUCTIONS
Information:  Medications:   Medication summary provided   I understand that I should take only the medications on my list.     -why and when I need to take each medicine.     -which side effects to watch for.     -that I should carry my medication information at all times in case of     Emergency situations.    I will take all of my medicines to follow up appointments.     -check with my physician or pharmacist before taking any new    Medication, over the counter product or drink alcohol.    -Ask about food, drug or dietary supplement interactions.    -discard old lists and update records with medication providers.    Notify Physician:  Notify physician if you notice:   Always call 911 if you feel your life is in danger  In case of an emergency call 911 immediately!  If 911 is not available call your local emergency medical system for help    Behavioral Health Follow Up:  Original Referral Source: [facility]  Discharge Diagnosis: Depression with suicidal ideation [F32.A, R45.851]  Recommendations for Level of Care: Take medication as prescribed and keep all scheduled appointments  Patient status at discharge:  In control, denies any suicidal/homicidal ideations  My hospital  was: [facility]  Aftercare plan faxed:  [facility]   -faxed by: staff   -date: 5-27-24   -time: 12:20pm  Prescriptions: Paper prescriptions sent with patient to fill at any Pharmacy    Smoking: Quit Smoking.   Call the [facility]'s smoking quitline at 0-911-30Q-QUIT  Know the signs of a heart attack   If you have any of the following symptoms call 911 immediately, do not wait more    Than five minutes.    1. Pressure, fullness and/ or squeezing in the center of the chest spreading to    The jaw, neck or shoulder.    2. Chest discomfort with light headedness, fainting, sweating, nausea or    Shortness of breath.   3. Upper abdominal pressure or discomfort.   4. Lower chest pain, back pain, unusual fatigue, shortness of breath, nausea   Or dizziness.

## 2024-05-27 NOTE — PLAN OF CARE
Problem: Self Harm/Suicidality  Goal: Will have no self-injury during hospital stay  Description: INTERVENTIONS:  1.  Ensure constant observer at bedside with Q15M safety checks  2.  Maintain a safe environment  3.  Secure patient belongings  4.  Ensure family/visitors adhere to safety recommendations  5.  Ensure safety tray has been added to patient's diet order  6.  Every shift and PRN: Re-assess suicidal risk via Frequent Screener    5/26/2024 2215 by Janet Hughes, RN  Outcome: Progressing   Patient denies suicidal idealization upon assessment with writer. Patient remains free from self harm at this time. Q15 minute round cont.   Problem: Depression  Goal: Will be euthymic at discharge  Description: Patient is attempting to progress towards goal.  5/26/2024 2215 by Janet Hughes, RN  Outcome: Progressing   Patient denies feelings of depression upon assessment. Patient is social in the day room with peers.   
  Problem: Self Harm/Suicidality  Goal: Will have no self-injury during hospital stay  Description: Patient denies any suicidal/homicidal ideations but reports general anxiety with depression that's managed with rest and routine medications. Patient has some moments of manic behavior such as a increase in energy and inappropriate laughter. Patient is cooperative with no redirection needed and medication compliant without behaviors.  1.  Ensure constant observer at bedside with Q15M safety checks  2.  Maintain a safe environment  3.  Secure patient belongings  4.  Ensure family/visitors adhere to safety recommendations  5.  Ensure safety tray has been added to patient's diet order  6.  Every shift and PRN: Re-assess suicidal risk via Frequent Screener    5/24/2024 0902 by Shae Do LPN  Outcome: Progressing     Problem: Depression  Goal: Will be euthymic at discharge  Description: Patient is attempting to progress towards goal.  5/24/2024 0902 by Shae Do LPN  Outcome: Progressing     Problem: Anxiety  Goal: Will report anxiety at manageable levels  Description: Patient is managing her symptoms with 1:1 talk time, routine medications and rest.  5/24/2024 0902 by Sahe Do LPN  Outcome: Progressing     Problem: Pain  Goal: Verbalizes/displays adequate comfort level or baseline comfort level  Description: Patient denies any discomfort and exhibits no symptoms.  5/24/2024 0902 by Shae Do LPN  Outcome: Progressing     
[facility]  Initial Interdisciplinary Treatment Plan NO      Original treatment plan Date & Time:  5/24/24 0859    Admission Type:  Admission Type: Voluntary    Reason for admission:   Reason for Admission: Patient reports family told her \"You will never see your son again\" Patient reports this upset her and caused her to have increase in suicidal thoughts and thoughts to harm family. She is currently living at the [facility] after moving her for a ewa she met.    Estimated Length of Stay:  5-7days  Estimated Discharge Date: to be determined by physician    PATIENT STRENGTHS:  Patient Strengths:   Patient Strengths and Limitations:Limitations: Difficult relationships / poor social skills, Difficulty problem solving/relies on others to help solve problems, Inappropriate/potentially harmful leisure interests, Multiple barriers to leisure interests, Apathetic / unmotivated, Hopeless about future  Addictive Behavior: Addictive Behavior  In the Past 3 Months, Have You Felt or Has Someone Told You That You Have a Problem With  : None  Medical Problems:  Past Medical History:   Diagnosis Date    Anxiety     Depression      Status EXAM:Mental Status and Behavioral Exam  Normal: Yes  Level of Assistance: Independent/Self  Facial Expression: Avoids Gaze  Affect: Blunt  Level of Consciousness: Alert  Frequency of Checks: 4 times per hour, close  Mood:Normal: No  Mood: Depressed, Anxious  Motor Activity:Normal: No  Motor Activity: Decreased  Eye Contact: Fair  Observed Behavior: Cooperative, Guarded  Sexual Misconduct History: Current - no  Preception: [place] to person, [place] to time, [place] to place  Attention:Normal: No  Attention: Distractible  Thought Processes: Blocking  Thought Content:Normal: No  Thought Content: Preoccupations  Depression Symptoms: Loss of interest, Feelings of helplessness, Feelings of hopelessess  Anxiety Symptoms: Generalized  Pauline Symptoms: No problems reported or 
Patient has been out in the milieu and social with peers. Patient admits to some anxiety. Patient states she has been eating and sleeping okay. Patient took prn medications for sleep and anxiety. Patient denies any suicidal thoughts at this time. Patient agrees to come talk with staff if having any thoughts to harm himself this shift. 15 min rounds continued for patient safety.   Problem: Self Harm/Suicidality  Goal: Will have no self-injury during hospital stay  Description: INTERVENTIONS:  1.  Ensure constant observer at bedside with Q15M safety checks  2.  Maintain a safe environment  3.  Secure patient belongings  4.  Ensure family/visitors adhere to safety recommendations  5.  Ensure safety tray has been added to patient's diet order  6.  Every shift and PRN: Re-assess suicidal risk via Frequent Screener    5/26/2024 0038 by Brittany Hughes, RN  Outcome: Progressing     Problem: Depression  Goal: Will be euthymic at discharge  Description: Patient is attempting to progress towards goal.  5/26/2024 0038 by Brittany Hughes RN  Outcome: Progressing     Problem: Behavior  Goal: Pt/Family maintain appropriate behavior and adhere to behavioral management agreement, if implemented  Description: INTERVENTIONS:  1. Assess patient/family's coping skills and  non-compliant behavior (including use of illegal substances)  2. Notify security of behavior or suspected illegal substances which indicate the need for search of the family and/or belongings  3. Encourage verbalization of thoughts and concerns in a socially appropriate manner  4. Utilize positive, consistent limit setting strategies supporting safety of patient, staff and others  5. Encourage participation in the decision making process about the behavioral management agreement  6. If a visitor's behavior poses a threat to safety call refer to organization policy.  7. Initiate consult with , Psychosocial CNS, Spiritual Care as 
Patient was out this evening playing games with peers. Patient admits to some depression and anxiety. Patient had no scheduled medications this evening. Patient denies any suicidal thoughts at this time. Patient agrees to come talk with staff if having any thoughts to harm himself this shift. 15 min rounds continued for patient safety.   Problem: Self Harm/Suicidality  Goal: Will have no self-injury during hospital stay  Description: INTERVENTIONS:  1.  Ensure constant observer at bedside with Q15M safety checks  2.  Maintain a safe environment  3.  Secure patient belongings  4.  Ensure family/visitors adhere to safety recommendations  5.  Ensure safety tray has been added to patient's diet order  6.  Every shift and PRN: Re-assess suicidal risk via Frequent Screener    5/24/2024 2248 by Brittany Hughes, RN  Outcome: Progressing     Problem: Depression  Goal: Will be euthymic at discharge  Description: Patient is attempting to progress towards goal.  5/24/2024 2248 by Brittany Hughes, RN  Outcome: Progressing     Problem: Behavior  Goal: Pt/Family maintain appropriate behavior and adhere to behavioral management agreement, if implemented  Description: INTERVENTIONS:  1. Assess patient/family's coping skills and  non-compliant behavior (including use of illegal substances)  2. Notify security of behavior or suspected illegal substances which indicate the need for search of the family and/or belongings  3. Encourage verbalization of thoughts and concerns in a socially appropriate manner  4. Utilize positive, consistent limit setting strategies supporting safety of patient, staff and others  5. Encourage participation in the decision making process about the behavioral management agreement  6. If a visitor's behavior poses a threat to safety call refer to organization policy.  7. Initiate consult with , Psychosocial CNS, Spiritual Care as appropriate  5/24/2024 2248 by Brittany Hughes 
Problem: Self Harm/Suicidality  Goal: Will have no self-injury during hospital stay  Description: INTERVENTIONS:  1.  Ensure constant observer at bedside with Q15M safety checks  2.  Maintain a safe environment  3.  Secure patient belongings  4.  Ensure family/visitors adhere to safety recommendations  5.  Ensure safety tray has been added to patient's diet order  6.  Every shift and PRN: Re-assess suicidal risk via Frequent Screener    Outcome: Progressing     Problem: Behavior  Goal: Pt/Family maintain appropriate behavior and adhere to behavioral management agreement, if implemented  Description: INTERVENTIONS:  1. Assess patient/family's coping skills and  non-compliant behavior (including use of illegal substances)  2. Notify security of behavior or suspected illegal substances which indicate the need for search of the family and/or belongings  3. Encourage verbalization of thoughts and concerns in a socially appropriate manner  4. Utilize positive, consistent limit setting strategies supporting safety of patient, staff and others  5. Encourage participation in the decision making process about the behavioral management agreement  6. If a visitor's behavior poses a threat to safety call refer to organization policy.  7. Initiate consult with , Psychosocial CNS, Spiritual Care as appropriate  Outcome: Progressing     The patient has been isolative to her room. She denies current thoughts of self harm. She denies experiencing any depression and anxiousness. She is aloof of others and only out for needs. She has been able to maintain behavioral control. She is withdrawn and guarded. Writer will continue to offer emotional support. Q15 minute checks to continue for safety.   
level  Description: Patient denies any discomfort and exhibits no symptoms.  5/25/2024 1056 by Olivia La LPN  Outcome: Progressing     Patient remains safe on unit, free from injury and self-harm.  Patient reports having fleeting suicidal ideations, but currently denies, and contracts for safety on unit.  Patient is flat, guarded, withdrawn, and mostly isolative.  Patient remains in behavioral control, and has been compliant with medications and care.    
level  Description: Patient denies any discomfort and exhibits no symptoms.  5/26/2024 1043 by Olivia La LPN  Outcome: Progressing     Patient remains safe on unit, free from injury and self-harm.  Patient denies suicidal ideations or thoughts of self-harm, and agrees to notify staff if such thoughts arise.  15 minute safety checks in place and will continue.  Patient is brightened, out and social with peers, and attending groups.  Patient reports adequate food and fluid intake.  Patient is pleasant, cooperative, and compliant.   
reported or observed.  Anxiety Symptoms: Generalized  Pauline Symptoms: No problems reported or observed.  Hallucinations: None  Delusions: No  Memory:Normal: Yes  Memory: Poor remote  Insight and Judgment: No  Insight and Judgment: Poor judgment, Poor insight, Unmotivated    Daily Assessment Last Entry:   Daily Sleep (WDL): Within Defined Limits            Daily Nutrition (WDL): Within Defined Limits  Level of Assistance: Independent/Self    Patient Monitoring:  Frequency of Checks: 4 times per hour, close    Psychiatric Symptoms:   Depression Symptoms  Depression Symptoms: No problems reported or observed.  Anxiety Symptoms  Anxiety Symptoms: Generalized  Pauline Symptoms  Pauline Symptoms: No problems reported or observed.          Suicide Risk CSSR-S:  1) Within the past month, have you wished you were dead or wished you could go to sleep and not wake up? : Yes  2) Have you actually had any thoughts of killing yourself? : Yes  3) Have you been thinking about how you might kill yourself? : Yes  5) Have you started to work out or worked out the details of how to kill yourself? Do you intend to carry out this plan? : No  6) Have you ever done anything, started to do anything, or prepared to do anything to end your life?: No  Change in Result no  Change in Plan of care no       EDUCATION:   EDUCATION:   Learner Progress Toward Treatment Goals: Reviewed results and recommendations of this team, Reviewed group plan and strategies, Reviewed signs, symptoms and risk of self harm and violent behavior, Reviewed goals and plan of care    Method:small group, individual verbal education    Outcome:verbalized by patient, but needs reinforcement to obtain goals    PATIENT GOALS:  Short term: \"Maintaining sobriety, sober living, managing coping skills trying to effectively cope with emotions.\"  Long term:\" Sobriety is really the big thing.\"    PLAN/TREATMENT RECOMMENDATIONS UPDATE: continue with group therapies, increased

## 2024-05-27 NOTE — GROUP NOTE
Group Therapy Note    Date: 5/27/2024    Group Start Time: 0900  Group End Time: 0930  Group Topic: Group Documentation    [facility] Shae Lopez LPN        Group Therapy Note    Attendees: 12/15         Patient's Goal:  inspire    Notes:  educate    Status After Intervention:  Improved    Participation Level: Minimal    Participation Quality: Sharing      Speech:  hesitant      Thought Process/Content: Logical      Affective Functioning: Congruent      Mood: anxious      Level of consciousness:  Alert      Response to Learning: Progressing to goal      Endings: None Reported    Modes of Intervention: Education      Discipline Responsible: Licensed Practical Nurse      Signature:  Shae Do LPN

## 2024-05-27 NOTE — DISCHARGE SUMMARY
Provider Discharge Summary     Patient ID:  Fransisca Galindo  [id number]  23 y.o.  2000    Admit date: 5/23/2024    Discharge date and time: 5/27/2024  4:06 PM     Admitting Physician: Nish Altman MD     Discharge Physician: John Salmeron MD    Admission Diagnoses: Depression with suicidal ideation [F32.A, R45.851]    Discharge Diagnoses:      Major depressive disorder, recurrent, severe without psychotic behavior (HCC)     Patient Active Problem List   Diagnosis Code    26 weeks gestation of pregnancy Z3A.26    Pregnancy with complication O26.90    Complicated pregnancy, third trimester O26.93    Complicated pregnancy O26.90    Gastroenteritis and colitis, viral A08.4    Anxiety and depression F41.9, F32.A    Renal calculus, bilateral N20.0    Asymptomatic bacteriuria in pregnancy O99.891, R82.71    33 weeks gestation of pregnancy Z3A.33    35 weeks gestation of pregnancy Z3A.35    Irregular contractions O47.9    MDD (major depressive disorder), recurrent episode, moderate (HCC) F33.1    Bipolar 2 disorder, major depressive episode (HCC) F31.81    Depression with suicidal ideation F32.A, R45.851    Major depressive disorder, recurrent, severe without psychotic behavior (HCC) F33.2        Admission Condition: poor    Discharged Condition: stable    Indication for Admission: threat to self    History of Present Illnes (present tense wording is of findings from admission exam and are not necessarily indicative of current findings):   Fransisca Galindo is a 23 y.o. female who has a past medical history of mental illness and polysubstance use. Patient presented to the ED via self reporting suicidal ideation with plan to overdose on fentanyl.  She also expressed vague HI towards family because she feels they do not care about her.  Her sister has custody of her [id number]-tbd son.  Patient has been using illicit substances regularly.  She is from the [facility] but came to [place] to be near a man she met recently.  Patient

## 2024-05-27 NOTE — TRANSITION OF CARE
Behavioral Health Transition Record    Patient Name: Fransisca Galindo  YOB: 2000   Medical Record Number: [id number]  Date of Admission: 5/23/2024 12:48 PM   Date of Discharge: 5-    Attending Provider: Nish Altman MD   Discharging Provider: Dr Altman  To contact this individual call  and ask the  to page.  If unavailable, ask to be transferred to Behavioral Health Provider on call.  A Behavioral Health Provider will be available on call 24/7 and during holidays.    Primary Care Provider: Adriana Ortega    Allergies   Allergen Reactions    Penicillins Rash    Sulfa Antibiotics Rash       Reason for Admission: Suicidal ideation with plan and intent to over dose on Fentanyl. Homicidal ideation towards family that does not care but has custody of 10 month old baby ( sister does).    Admission Diagnosis: Depression with suicidal ideation [F32.A, R45.851]    * No surgery found *    Results for orders placed or performed during the hospital encounter of 05/23/24   Iron and TIBC   Result Value Ref Range    Iron 24 (L) 37 - 145 ug/dL    TIBC 304 250 - 450 ug/dL    Iron % Saturation 8 (L) 20 - 55 %    UIBC 280 112 - 347 ug/dL   TSH with Reflex   Result Value Ref Range    TSH 0.88 0.30 - 5.00 uIU/mL       Immunizations administered during this encounter:   Immunization History   Administered Date(s) Administered    COVID-19, MODERNA BLUE border, Primary or Immunocompromised, (age 12y+), IM, 100 mcg/0.5mL 05/22/2021    Influenza, FLUARIX, FLULAVAL, FLUZONE (age 6 mo+) AND AFLURIA, (age 3 y+), PF, 0.5mL 02/27/2024     Influenza Vaccination Status: Influenza vaccine was received prior to admission during the current flu season, not during this hospitalization.    Screening for Metabolic Disorders for Patients on Antipsychotic Medications  (Data obtained from the EMR)    Estimated Body Mass Index  Body mass index is 35.02 kg/m².      Vital Signs/Blood Pressure  BP (!) 108/96   Pulse 81

## 2024-05-27 NOTE — BH NOTE
[facility]  Discharge Note    Pt discharged with followings belongings:   Dental Appliances: None  Vision - Corrective Lenses: None  Hearing Aid: None  Jewelry: Earrings  Body Piercings Removed: Yes  Clothing: Footwear, Pants, Shirt, Socks, Undergarments  Other Valuables: Money, Purse, Wallet, Keys, Credit/Debit Card   Valuables sent home with patient or returned to patient. Patient educated on aftercare instructions: YES  Information faxed to [facility] by staff  at 11:49 AM .Patient verbalize understanding of AVS:  YES.    Status EXAM upon discharge:  Mental Status and Behavioral Exam  Normal: No  Level of Assistance: Independent/Self  Facial Expression: Flat  Affect: Blunt  Level of Consciousness: Alert  Frequency of Checks: 4 times per hour, close  Mood:Normal: No  Mood: Anxious  Motor Activity:Normal: Yes  Motor Activity: Decreased  Eye Contact: Fair  Observed Behavior: Cooperative, Guarded  Sexual Misconduct History: Current - no  Preception: [place] to person, [place] to time, [place] to place  Attention:Normal: No  Attention: Distractible  Thought Processes: Blocking  Thought Content:Normal: No  Thought Content: Preoccupations  Depression Symptoms: Feelings of helplessness  Anxiety Symptoms: Generalized  Pauline Symptoms: No problems reported or observed.  Hallucinations: None  Delusions: No  Memory:Normal: Yes  Memory: Poor remote  Insight and Judgment: No  Insight and Judgment: Poor judgment, Poor insight    Tobacco Screening:  Practical Counseling, on admission, nicko X, if applicable and completed (first 3 are required if patient doesn't refuse):            ( x ) Recognizing danger situations (included triggers and roadblocks)                    ( x) Coping skills (new ways to manage stress,relaxation techniques, changing routine, distraction)                                                           ( ) Basic information about quitting (benefits of quitting, techniques in how to quit, available

## 2024-05-28 NOTE — CARE COORDINATION
Name: Fransisca Galindo    : 2000    Auth number: [id number]     Discharge Date: 24    Destination: home     Discharge Medications:      Medication List        START taking these medications      ferrous sulfate 325 (65 Fe) MG tablet  Commonly known as: IRON 325  Take 1 tablet by mouth 2 times daily (with meals)  Notes to patient: supplement     hydrOXYzine HCl 50 MG tablet  Commonly known as: ATARAX  Take 1 tablet by mouth 3 times daily as needed for Anxiety  Notes to patient: anxiety     traZODone 50 MG tablet  Commonly known as: DESYREL  Take 1 tablet by mouth nightly as needed for Sleep  Notes to patient: Sleep aid            CONTINUE taking these medications      ARIPiprazole 10 MG tablet  Commonly known as: ABILIFY  Take 1 tablet by mouth daily  Notes to patient: Mood/ clear thoughts     sertraline 50 MG tablet  Commonly known as: ZOLOFT  Take 1 tablet by mouth daily  Notes to patient: Mood/ clear thoughts            STOP taking these medications      hydrOXYzine pamoate 25 MG capsule  Commonly known as: VISTARIL               Where to Get Your Medications        You can get these medications from any pharmacy    Bring a paper prescription for each of these medications  ARIPiprazole 10 MG tablet  ferrous sulfate 325 (65 Fe) MG tablet  hydrOXYzine HCl 50 MG tablet  sertraline 50 MG tablet  traZODone 50 MG tablet         Follow Up Appointment: [facility] [address] [address] [phone]  Go on 2024  Please complete walk-in hours within 7 days of your discharge.  WALK-IN HOURS:  Monday-Friday  9AM-4PM

## 2024-10-18 NOTE — BH NOTE
Patient transferred to Radiology for ultrasound per provider's orders. Patient was escorted via wheelchair and two staff.  Patient tolerated procedure well and without complaint.    yes